# Patient Record
Sex: FEMALE | Race: WHITE
[De-identification: names, ages, dates, MRNs, and addresses within clinical notes are randomized per-mention and may not be internally consistent; named-entity substitution may affect disease eponyms.]

---

## 2019-01-02 ENCOUNTER — HOSPITAL ENCOUNTER (INPATIENT)
Dept: HOSPITAL 25 - ED | Age: 79
LOS: 13 days | Discharge: NURSING, REHAB OR LONG-TERM CARE | DRG: 481 | End: 2019-01-15
Attending: INTERNAL MEDICINE | Admitting: HOSPITALIST
Payer: MEDICARE

## 2019-01-02 DIAGNOSIS — E78.5: ICD-10-CM

## 2019-01-02 DIAGNOSIS — Z83.3: ICD-10-CM

## 2019-01-02 DIAGNOSIS — Z95.5: ICD-10-CM

## 2019-01-02 DIAGNOSIS — M85.861: ICD-10-CM

## 2019-01-02 DIAGNOSIS — I48.91: ICD-10-CM

## 2019-01-02 DIAGNOSIS — B96.20: ICD-10-CM

## 2019-01-02 DIAGNOSIS — Z82.49: ICD-10-CM

## 2019-01-02 DIAGNOSIS — D62: ICD-10-CM

## 2019-01-02 DIAGNOSIS — N39.0: ICD-10-CM

## 2019-01-02 DIAGNOSIS — I25.10: ICD-10-CM

## 2019-01-02 DIAGNOSIS — Z79.02: ICD-10-CM

## 2019-01-02 DIAGNOSIS — Z79.899: ICD-10-CM

## 2019-01-02 DIAGNOSIS — I11.0: ICD-10-CM

## 2019-01-02 DIAGNOSIS — E03.9: ICD-10-CM

## 2019-01-02 DIAGNOSIS — S72.141A: Primary | ICD-10-CM
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
## 2019-01-02 DIAGNOSIS — Z79.82: ICD-10-CM

## 2019-01-02 DIAGNOSIS — Z79.01: ICD-10-CM

## 2019-01-02 DIAGNOSIS — Z80.1: ICD-10-CM

## 2019-01-02 DIAGNOSIS — Z99.81: ICD-10-CM

## 2019-01-02 DIAGNOSIS — Z79.52: ICD-10-CM

## 2019-01-02 DIAGNOSIS — Z87.891: ICD-10-CM

## 2019-01-02 DIAGNOSIS — K21.9: ICD-10-CM

## 2019-01-02 DIAGNOSIS — J44.9: ICD-10-CM

## 2019-01-02 DIAGNOSIS — R91.1: ICD-10-CM
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
## 2019-01-02 DIAGNOSIS — R09.02: ICD-10-CM

## 2019-01-02 DIAGNOSIS — I50.22: ICD-10-CM

## 2019-01-02 DIAGNOSIS — N63.11: ICD-10-CM

## 2019-01-02 LAB
ALBUMIN SERPL BCG-MCNC: 3.2 G/DL (ref 3.2–5.2)
ALBUMIN/GLOB SERPL: 1.3 {RATIO} (ref 1–3)
ALP SERPL-CCNC: 70 U/L (ref 34–104)
ALT SERPL W P-5'-P-CCNC: 16 U/L (ref 7–52)
ANION GAP SERPL CALC-SCNC: 4 MMOL/L (ref 2–11)
APTT PPP: 26 SECONDS (ref 26–36.3)
AST SERPL-CCNC: 22 U/L (ref 13–39)
BASOPHILS # BLD AUTO: 0.1 10^3/UL (ref 0–0.2)
BUN SERPL-MCNC: 14 MG/DL (ref 6–24)
BUN/CREAT SERPL: 18.4 (ref 8–20)
CALCIUM SERPL-MCNC: 9.1 MG/DL (ref 8.6–10.3)
CHLORIDE SERPL-SCNC: 101 MMOL/L (ref 101–111)
DIGOXIN SERPL-MCNC: 0.8 NG/ML (ref 0.8–2)
EOSINOPHIL # BLD AUTO: 0.4 10^3/UL (ref 0–0.6)
GLOBULIN SER CALC-MCNC: 2.5 G/DL (ref 2–4)
GLUCOSE SERPL-MCNC: 101 MG/DL (ref 70–100)
HCO3 SERPL-SCNC: 33 MMOL/L (ref 22–32)
HCT VFR BLD AUTO: 38 % (ref 35–47)
HGB BLD-MCNC: 12.5 G/DL (ref 12–16)
INR PPP/BLD: 0.96 (ref 0.77–1.02)
LYMPHOCYTES # BLD AUTO: 0.9 10^3/UL (ref 1–4.8)
MCH RBC QN AUTO: 31 PG (ref 27–31)
MCHC RBC AUTO-ENTMCNC: 33 G/DL (ref 31–36)
MCV RBC AUTO: 95 FL (ref 80–97)
MONOCYTES # BLD AUTO: 1 10^3/UL (ref 0–0.8)
NEUTROPHILS # BLD AUTO: 9.7 10^3/UL (ref 1.5–7.7)
NRBC # BLD AUTO: 0 10^3/UL
NRBC BLD QL AUTO: 0.1
PLATELET # BLD AUTO: 163 10^3/UL (ref 150–450)
POTASSIUM SERPL-SCNC: 4.4 MMOL/L (ref 3.5–5)
PROT SERPL-MCNC: 5.7 G/DL (ref 6.4–8.9)
RBC # BLD AUTO: 4 10^6/UL (ref 4–5.4)
RBC UR QL AUTO: (no result)
SODIUM SERPL-SCNC: 138 MMOL/L (ref 135–145)
WBC # BLD AUTO: 12 10^3/UL (ref 3.5–10.8)
WBC UR QL AUTO: (no result)

## 2019-01-02 PROCEDURE — 94640 AIRWAY INHALATION TREATMENT: CPT

## 2019-01-02 PROCEDURE — 87186 SC STD MICRODIL/AGAR DIL: CPT

## 2019-01-02 PROCEDURE — 72192 CT PELVIS W/O DYE: CPT

## 2019-01-02 PROCEDURE — 71045 X-RAY EXAM CHEST 1 VIEW: CPT

## 2019-01-02 PROCEDURE — 84484 ASSAY OF TROPONIN QUANT: CPT

## 2019-01-02 PROCEDURE — 36415 COLL VENOUS BLD VENIPUNCTURE: CPT

## 2019-01-02 PROCEDURE — 99284 EMERGENCY DEPT VISIT MOD MDM: CPT

## 2019-01-02 PROCEDURE — 80162 ASSAY OF DIGOXIN TOTAL: CPT

## 2019-01-02 PROCEDURE — 80053 COMPREHEN METABOLIC PANEL: CPT

## 2019-01-02 PROCEDURE — G0279 TOMOSYNTHESIS, MAMMO: HCPCS

## 2019-01-02 PROCEDURE — 93306 TTE W/DOPPLER COMPLETE: CPT

## 2019-01-02 PROCEDURE — 81015 MICROSCOPIC EXAM OF URINE: CPT

## 2019-01-02 PROCEDURE — 86850 RBC ANTIBODY SCREEN: CPT

## 2019-01-02 PROCEDURE — 93970 EXTREMITY STUDY: CPT

## 2019-01-02 PROCEDURE — 77066 DX MAMMO INCL CAD BI: CPT

## 2019-01-02 PROCEDURE — 85730 THROMBOPLASTIN TIME PARTIAL: CPT

## 2019-01-02 PROCEDURE — 83605 ASSAY OF LACTIC ACID: CPT

## 2019-01-02 PROCEDURE — 81003 URINALYSIS AUTO W/O SCOPE: CPT

## 2019-01-02 PROCEDURE — 85610 PROTHROMBIN TIME: CPT

## 2019-01-02 PROCEDURE — 70450 CT HEAD/BRAIN W/O DYE: CPT

## 2019-01-02 PROCEDURE — 77062 BREAST TOMOSYNTHESIS BI: CPT

## 2019-01-02 PROCEDURE — 85025 COMPLETE CBC W/AUTO DIFF WBC: CPT

## 2019-01-02 PROCEDURE — 87077 CULTURE AEROBIC IDENTIFY: CPT

## 2019-01-02 PROCEDURE — 86900 BLOOD TYPING SEROLOGIC ABO: CPT

## 2019-01-02 PROCEDURE — 86901 BLOOD TYPING SEROLOGIC RH(D): CPT

## 2019-01-02 PROCEDURE — 71275 CT ANGIOGRAPHY CHEST: CPT

## 2019-01-02 PROCEDURE — 93005 ELECTROCARDIOGRAM TRACING: CPT

## 2019-01-02 PROCEDURE — 80048 BASIC METABOLIC PNL TOTAL CA: CPT

## 2019-01-02 PROCEDURE — 87086 URINE CULTURE/COLONY COUNT: CPT

## 2019-01-02 RX ADMIN — HEPARIN SODIUM SCH UNITS: 5000 INJECTION INTRAVENOUS; SUBCUTANEOUS at 22:04

## 2019-01-02 RX ADMIN — DONEPEZIL HYDROCHLORIDE SCH MG: 5 TABLET, FILM COATED ORAL at 21:57

## 2019-01-02 RX ADMIN — BUDESONIDE SCH MG: 0.5 SUSPENSION RESPIRATORY (INHALATION) at 22:52

## 2019-01-02 RX ADMIN — PRIMIDONE SCH MG: 50 TABLET ORAL at 21:58

## 2019-01-02 RX ADMIN — FORMOTEROL FUMARATE DIHYDRATE SCH MCG: 20 SOLUTION RESPIRATORY (INHALATION) at 22:52

## 2019-01-02 RX ADMIN — LEVETIRACETAM SCH MG: 500 TABLET, FILM COATED ORAL at 21:58

## 2019-01-02 RX ADMIN — POTASSIUM CHLORIDE SCH MEQ: 750 TABLET, FILM COATED, EXTENDED RELEASE ORAL at 21:58

## 2019-01-02 NOTE — ED
Lower Extremity





- HPI Summary


HPI Summary: 


78 year old female presents with htn, chf, copd, and asthma is on home oxygen 

presents with right hip pain.  she fell on a porch and landed on her right hip. 

fall was 2 days ago.  She states she was able to place weight on the leg 

yesterday but today was unable to do so due to pain.  She denies any numbness 

or tingling.  She denies any previous fracture the area.  She states the pain 

is mostly in her groin.  No back pain.  No urinary symptoms.  Fall was a 

mechanical fall.  No chest pain or shortness breath.  She is unsure if she had 

hit her head.  No loss consciousness.  She denies any chest pain or shortness 

breath.  No other injury.








- History of Current Complaint


Chief Complaint: EDExtremityLower


Stated Complaint: FELL ON RIGHT HIP


Time Seen by Provider: 01/02/19 14:36


Pain Intensity: 3





- Allergies/Home Medications


Home Medications: 


 Home Medications





Aspirin 81 mg CHEW TAB* [Aspirin Low Dose TAB*] 81 mg PO DAILY 01/02/19 [

History Confirmed 01/02/19]


Budesonide NEB* [Pulmicort NEB*] 0.5 mg INH BID 01/02/19 [History Confirmed 01/ 02/19]


Cholecalciferol (Vitamin D3) [Vitamin D3] 1,000 unit PO DAILY 01/02/19 [History 

Confirmed 01/02/19]


Clopidogrel TAB* [Plavix TAB*] 75 mg PO DAILY 01/02/19 [History Confirmed 01/02/ 19]


Digoxin [Digitek] 125 mcg PO DAILY 01/02/19 [History Confirmed 01/02/19]


Docusate Sodium [Stool Softener] 200 mg PO DAILY 01/02/19 [History Confirmed 01/ 02/19]


Donepezil TAB* [Aricept 5 MG TAB*] 10 mg PO BEDTIME 01/02/19 [History Confirmed 

01/02/19]


Ferrous Sulfate TAB* 325 mg PO DAILY 01/02/19 [History Confirmed 01/02/19]


Fluconazole 100 MG TAB* [Diflucan 100 MG TAB*] 100 mg PO WE 01/02/19 [History 

Confirmed 01/02/19]


Formoterol 20 MCG/2ML NEB (NF) [Perforomist NEB.SOLN*(NF)] 20 mcg INH BID 01/02/ 19 [History Confirmed 01/02/19]


Levalbuterol 0.63MG/3ML NEB* [Xopenex 0.63MG/3ML NEB*] 0.63 mg INH 1700 01/02/ 19 [History Confirmed 01/02/19]


Levalbuterol HFA INHALER* [Xopenex Hfa Inhaler*] 2 puff INH Q6HR PRN 01/02/19 [

History Confirmed 01/02/19]


Levothyroxine TAB* [Synthroid TAB*] 50 mcg PO DAILY 01/02/19 [History Confirmed 

01/02/19]


Ondansetron TAB* [Zofran 4 MG Tab*] 4 mg PO Q6H PRN 01/02/19 [History Confirmed 

01/02/19]


Pantoprazole TAB (NF) [Protonix TAB (NF)] 40 mg PO DAILY 01/02/19 [History 

Confirmed 01/02/19]


Potassium Chlor TAB* [Klor Con ER TAB*] 10 meq PO BID 01/02/19 [History 

Confirmed 01/02/19]


Primidone TAB(*) [Mysoline TAB(*)] 150 mg PO BEDTIME 01/02/19 [History 

Confirmed 01/02/19]


Rosuvastatin (NF) [Crestor (NF)] 10 mg PO DAILY 01/02/19 [History Confirmed 01/ 02/19]


Torsemide TAB* [Demadex*] 10 mg PO DAILY 01/02/19 [History Confirmed 01/02/19]


Venlafaxine EXT RELEASE CAP* [Effexor Xr CAP*] 225 mg PO DAILY 01/02/19 [

History Confirmed 01/02/19]


Vitamin B Complex CAP* [B Complex CAP*] 1 cap PO DAILY 01/02/19 [History 

Confirmed 01/02/19]


levETIRAcetam [Keppra] 250 mg PO TID 01/02/19 [History Confirmed 01/02/19]


predniSONE TAB* [Deltasone 10 MG TAB*] 10 mg PO DAILY 01/02/19 [History 

Confirmed 01/02/19]











PMH/Surg Hx/FS Hx/Imm Hx


Endocrine/Hematology History: Reports: Hx Anticoagulant Therapy


Cardiovascular History: Reports: Hx Hypertension


Respiratory History: Reports: Hx Asthma, Hx Chronic Obstructive Pulmonary 

Disease (COPD)





- Surgical History


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  

cholecystectomy.  appendectomy


Infectious Disease History: No


Infectious Disease History: 


   Denies: Traveled Outside the [place] in Last 30 Days





- Family History


Known Family History: Positive: Hypertension





- Social History


Alcohol Use: None


Substance Use Type: Reports: None


Smoking Status (MU): Unknown if Ever Smoked





Review of Systems


Negative: Fever


Negative: Chest Pain


Negative: Shortness Of Breath


Positive: Myalgia - right hip pain


All Other Systems Reviewed And Are Negative: Yes





Physical Exam


Triage Information Reviewed: Yes


Vital Signs On Initial Exam: 


 Initial Vitals











Temp Pulse Resp BP Pulse Ox


 


 97.8 F   85   16   115/58   99 


 


 01/02/19 14:24  01/02/19 14:24  01/02/19 14:24  01/02/19 14:24  01/02/19 14:24











Vital Signs Reviewed: Yes


Appearance: Positive: Well-Appearing


Skin: Positive: Warm, Dry


Head/Face: Positive: Normal Head/Face Inspection


Eyes: Positive: Normal, Conjunctiva Clear


ENT: Positive: Pharynx normal


Respiratory/Lung Sounds: Positive: Clear to Auscultation, Breath Sounds Present


Cardiovascular: Positive: Normal, RRR


Musculoskeletal: Positive: Other - right leg slightly shortened, good pulses, 

able to wiggle toes, tenderness right hip


Neurological: Positive: Normal


Psychiatric: Positive: Normal





Diagnostics





- Vital Signs


 Vital Signs











  Temp Pulse Resp BP Pulse Ox


 


 01/02/19 14:24  97.8 F  85  16  115/58  99














- Laboratory


Lab Results: 


 Lab Results











  01/02/19 01/02/19 01/02/19 Range/Units





  14:59 14:59 14:59 


 


WBC  12.0 H    (3.5-10.8)  10^3/ul


 


RBC  4.00    (4.00-5.40)  10^6/ul


 


Hgb  12.5    (12.0-16.0)  g/dl


 


Hct  38    (35-47)  %


 


MCV  95    (80-97)  fL


 


MCH  31    (27-31)  pg


 


MCHC  33    (31-36)  g/dl


 


RDW  14    (10.5-15)  %


 


Plt Count  163    (150-450)  10^3/ul


 


MPV  7.8    (7.4-10.4)  fL


 


Neut % (Auto)  80.9    %


 


Lymph % (Auto)  7.3    %


 


Mono % (Auto)  8.1    %


 


Eos % (Auto)  3.1    %


 


Baso % (Auto)  0.6    %


 


Absolute Neuts (auto)  9.7 H    (1.5-7.7)  10^3/ul


 


Absolute Lymphs (auto)  0.9 L    (1.0-4.8)  10^3/ul


 


Absolute Monos (auto)  1.0 H    (0-0.8)  10^3/ul


 


Absolute Eos (auto)  0.4    (0-0.6)  10^3/ul


 


Absolute Basos (auto)  0.1    (0-0.2)  10^3/ul


 


Absolute Nucleated RBC  0    10^3/ul


 


Nucleated RBC %  0.1    


 


INR (Anticoag Therapy)   0.96   (0.77-1.02)  


 


APTT   26.0   (26.0-36.3)  seconds


 


Sodium    138  (135-145)  mmol/L


 


Potassium    4.4  (3.5-5.0)  mmol/L


 


Chloride    101  (101-111)  mmol/L


 


Carbon Dioxide    33 H  (22-32)  mmol/L


 


Anion Gap    4  (2-11)  mmol/L


 


BUN    14  (6-24)  mg/dL


 


Creatinine    0.76  (0.51-0.95)  mg/dL


 


Est GFR ( Amer)    88.8  (>60)  


 


Est GFR (Non-Af Amer)    73.4  (>60)  


 


BUN/Creatinine Ratio    18.4  (8-20)  


 


Glucose    101 H  ()  mg/dL


 


Lactic Acid     (0.5-2.0)  mmol/L


 


Calcium    9.1  (8.6-10.3)  mg/dL


 


Total Bilirubin    0.70  (0.2-1.0)  mg/dL


 


AST    22  (13-39)  U/L


 


ALT    16  (7-52)  U/L


 


Alkaline Phosphatase    70  ()  U/L


 


Troponin I    0.02  (<0.04)  ng/mL


 


Total Protein    5.7 L  (6.4-8.9)  g/dL


 


Albumin    3.2  (3.2-5.2)  g/dL


 


Globulin    2.5  (2-4)  g/dL


 


Albumin/Globulin Ratio    1.3  (1-3)  


 


Blood Type     


 


Antibody Screen     














  01/02/19 01/02/19 Range/Units





  14:59 14:59 


 


WBC    (3.5-10.8)  10^3/ul


 


RBC    (4.00-5.40)  10^6/ul


 


Hgb    (12.0-16.0)  g/dl


 


Hct    (35-47)  %


 


MCV    (80-97)  fL


 


MCH    (27-31)  pg


 


MCHC    (31-36)  g/dl


 


RDW    (10.5-15)  %


 


Plt Count    (150-450)  10^3/ul


 


MPV    (7.4-10.4)  fL


 


Neut % (Auto)    %


 


Lymph % (Auto)    %


 


Mono % (Auto)    %


 


Eos % (Auto)    %


 


Baso % (Auto)    %


 


Absolute Neuts (auto)    (1.5-7.7)  10^3/ul


 


Absolute Lymphs (auto)    (1.0-4.8)  10^3/ul


 


Absolute Monos (auto)    (0-0.8)  10^3/ul


 


Absolute Eos (auto)    (0-0.6)  10^3/ul


 


Absolute Basos (auto)    (0-0.2)  10^3/ul


 


Absolute Nucleated RBC    10^3/ul


 


Nucleated RBC %    


 


INR (Anticoag Therapy)    (0.77-1.02)  


 


APTT    (26.0-36.3)  seconds


 


Sodium    (135-145)  mmol/L


 


Potassium    (3.5-5.0)  mmol/L


 


Chloride    (101-111)  mmol/L


 


Carbon Dioxide    (22-32)  mmol/L


 


Anion Gap    (2-11)  mmol/L


 


BUN    (6-24)  mg/dL


 


Creatinine    (0.51-0.95)  mg/dL


 


Est GFR ( Amer)    (>60)  


 


Est GFR (Non-Af Amer)    (>60)  


 


BUN/Creatinine Ratio    (8-20)  


 


Glucose    ()  mg/dL


 


Lactic Acid  1.6   (0.5-2.0)  mmol/L


 


Calcium    (8.6-10.3)  mg/dL


 


Total Bilirubin    (0.2-1.0)  mg/dL


 


AST    (13-39)  U/L


 


ALT    (7-52)  U/L


 


Alkaline Phosphatase    ()  U/L


 


Troponin I    (<0.04)  ng/mL


 


Total Protein    (6.4-8.9)  g/dL


 


Albumin    (3.2-5.2)  g/dL


 


Globulin    (2-4)  g/dL


 


Albumin/Globulin Ratio    (1-3)  


 


Blood Type   A Positive  


 


Antibody Screen   Negative  











Result Diagrams: 


 01/02/19 14:59





 01/02/19 14:59


Lab Statement: Any lab studies that have been ordered have been reviewed, and 

results considered in the medical decision making process.





- CT


  ** brain


CT Interpretation Completed By: Radiologist


Summary of CT Findings: IMPRESSION: Left frontal temporal craniotomy. Atrophy 

with no evidence of intracranial.  mass or hemorrhage. Encephalomalacia in the 

left frontal temporal lobe is noted.





  ** pelvis


CT Interpretation Completed By: Radiologist


Summary of CT Findings: IMPRESSION: Fracture of the right femur at the neck 

with extension into the right greater.  trochanter. The fracture may be 

slightly impacted.





- EKG


  ** No standard instances


Cardiac Rate: NL


EKG Rhythm: Sinus Rhythm


Summary of EKG Findings: sinus rhythm, bundle branch block





Lower Extremity Course/Dx





- Course


Course Of Treatment: 78 year old female presents with htn, chf, copd, and 

asthma is on home oxygen presents with right hip pain.  she fell on a porch and 

landed on her right hip. fall was 2 days ago.  She states she was able to place 

weight on the leg yesterday but today was unable to do so due to pain.  She 

denies any numbness or tingling.  She denies any previous fracture the area.  

She states the pain is mostly in her groin.  No back pain.  No urinary 

symptoms.  Fall was a mechanical fall.  No chest pain or shortness breath.  She 

is unsure if she had hit her head.  No loss consciousness.  She denies any 

chest pain or shortness breath.  No other injury.  on exam has slight 

shortening of right leg. neurovascular intact. CT brain no new finding. CT 

pelvis showed fracture of right femur at the neck with extrension into right 

greater trochanter.  spoke with dr saldaña who wants xrays of the hip. spoke 

with dr santos who agrees to admit.





- Diagnoses


Differential Diagnosis/HQI/PQRI: Positive: Fracture (Closed), Sprain, Strain


Provider Diagnoses: 


 Fracture of greater trochanter of right femur








Discharge





- Sign-Out/Discharge


Documenting (check all that apply): Patient Departure





- Discharge Plan


Condition: Stable


Disposition: ADMITTED TO [facility]


Referrals: 


No Primary Care Phys,NOPCP [Primary Care Provider] - 





- Billing Disposition and Condition


Condition: STABLE


Disposition: Admitted to [facility]

## 2019-01-02 NOTE — CONS
CONSULTATION REPORT:

 

DATE OF CONSULT:  01/02/19

 

ATTENDING PHYSICIAN:  Magalys Orlando MD

 

CHIEF COMPLAINT:  Right hip pain.

 

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 
ambulator who fell [place] when she slipped on wet wood and landed on 
her right side.  She had immediate pain, but was able to weight bear for about 
2 days until this morning.  She was having difficulty getting up.  She was able 
to sit for prolonged periods of time, walk around her daughter's house and go 
to the bathroom, and then all of a sudden she could not weight bear comfortably 
today.  She has a complicated medical history, is on home oxygen; has asthma, 
COPD, CHF.  She is currently on Plavix and took her last dose yesterday.  She 
denies any significant fevers or chills.  She does have a history 3 weeks ago 
presenting to an ER with a possible stroke which the patient states was ruled 
not a stroke.  No numbness or tingling.  She does have an abrasion to the right 
anterior aspect of the shin, but that was unrelated.  She bumped her leg on 
something.

 

PAST MEDICAL HISTORY:  Significant for:

 

1.  Hypertension.

2.  CHF.

3.  COPD.

4.  Asthma, on home O2.

5.  History of benign brain tumor.

6.  She has heart disease, status post catheterization.

 

PAST SURGICAL HISTORY:  Tonsillectomy, appendectomy, cholecystectomy, surgery 
on the left side of her skull for a brain tumor that was benign and underwent 
craniotomy and then subsequently had a revision craniotomy for loose hardware 
but this was a few years ago.  She has an implantable device and has had 
cardiac stents as well.

 

MEDICATIONS:  At home include:

 

1.  Aspirin.

2.  Budesonide.

3.  Cholecalciferol.

4.  Plavix.

5.  Digoxin.

6.  Docusate.

7.  Donepezil.

8.  Ferrous sulfate.

9.  Fluconazole.

10.  Formoterol.

11.  Levalbuterol.

12.  Levothyroxine.

13.  Ondansetron.

14.  Pantoprazole.

15.  Potassium chloride.

16.  Primidone.

17.  Rosuvastatin.

18.  Torsemide.

19.  Venlafaxine.

20.  Vitamin B.

21.  Keppra.

22.  Prednisone.

 

FAMILY HISTORY:  Significant for high blood pressure.

 

SOCIAL HISTORY:  She lives with her daughter.  She uses no assistive devices.  
She is right-hand dominant.  She quit smoking many years ago.  She denies 
alcohol and illicit drugs.  She lives in a [facility].

 

REVIEW OF SYSTEMS:  A 14-point review of systems reviewed with the patient, 
significant only for the right hip pain, abrasion to the right leg that was 
from previously 3 weeks ago, history of possible stroke which was ruled not a 
stroke, no chest pain.  She has shortness of breath at baseline and is on 
oxygen at baseline. Otherwise, remainder of the system is negative.

 

PHYSICAL EXAM:  She is in no acute distress.  She is well developed, well 
nourished.  She is alert and  oriented x3.  She has pleasant mood and normal 
affect.  She has dentures.  Temperature is 97.8, pulse is 74, O2 is 99%, blood 
pressure is 117/78.  She is resting comfortably in her bed.  She has pleasant 
mood and normal affect.  EOMI. She respires easily with no labored breathing. 
Abdomen is soft and nontender. Heart is regular rate and rhythm. Examination of 
the right hip demonstrates skin is intact.  She is tender about the lateral 
aspect of the hip.  She has mild discomfort with internal rotation.  Her calf 
is soft and nontender.  She has abrasion to the anterior tibia and she is 
sensate to light touch about the first dorsal space, medial, lateral, dorsal 
and plantar foot.  She has a brisk cap refill.

 

DIAGNOSTIC STUDIES/LAB DATA:  X-rays:  Views of the right hip and femur were 
reviewed that demonstrates a greater trochanteric fracture.  CT scan that was 
obtained prior to the x-rays demonstrated a greater trochanteric fracture that 
extends into the femoral neck posteriorly.

 

Labs:  White blood cell count of 12, hematocrit of 38, platelet of 163.  INR of 
0.96.  Sodium of 138, potassium 4.4, chloride 101, carbon dioxide 33, BUN is 14
, creatinine 0.76, glucose 101, lactic acid 1.6, and troponin 0.02.

 

ASSESSMENT AND PLAN:  This is an unusual fracture.  She [place] herself [place] approximately 2 days ago and was initially able to weight bear, but 
now it is progressing not being able to weight bear.  It looks like the 
fracture is connecting to the femoral neck and is extending along the 
intertrochanteric line. At this point, I am unable to get an MRI to see if 
there is any femoral neck edema, but I assume based on the patient's exam and 
the worsening symptoms that she has. I will treat this with an intramedullary 
nail.  I did discuss with one of my colleagues about management of this.  The 
patient's family does know Dr. Moreau and has asked me to reach out to her for a 
possible intervention.  The patient is unable to have surgery until she is 
appropriately medically optimized, part of which is including 72 hours from her 
last Plavix dose.  She is currently more than a day and will not be able to be 
a candidate for surgery until Friday afternoon.  I will touch base with my 
colleague, Dr. Moreau about this as well.  Risks and benefits of surgery were 
discussed at length, including but not limited to bleeding; infection; damage 
to nerves, vessels, surrounding structures; wound nonhealing; persistent pain; 
need for surgery; scaring stiffness; incomplete release of symptoms; risks of 
anesthesia.  We talked about loss of bone level and functionality mobility.  We 
talked about the risk of DVT and mortality.  I discussed the plan with the 
hospital service.  We will continue monitoring her until she is able to proceed 
with surgery.

 

 [id number]/CPS #: [id number]

ELIZA

## 2019-01-03 LAB
ANION GAP SERPL CALC-SCNC: 4 MMOL/L (ref 2–11)
BASOPHILS # BLD AUTO: 0 10^3/UL (ref 0–0.2)
BUN SERPL-MCNC: 18 MG/DL (ref 6–24)
BUN/CREAT SERPL: 20 (ref 8–20)
CALCIUM SERPL-MCNC: 8.7 MG/DL (ref 8.6–10.3)
CHLORIDE SERPL-SCNC: 101 MMOL/L (ref 101–111)
EOSINOPHIL # BLD AUTO: 0.4 10^3/UL (ref 0–0.6)
GLUCOSE SERPL-MCNC: 104 MG/DL (ref 70–100)
HCO3 SERPL-SCNC: 32 MMOL/L (ref 22–32)
HCT VFR BLD AUTO: 37 % (ref 35–47)
HGB BLD-MCNC: 12.2 G/DL (ref 12–16)
INR PPP/BLD: 0.98 (ref 0.77–1.02)
LYMPHOCYTES # BLD AUTO: 0.8 10^3/UL (ref 1–4.8)
MCH RBC QN AUTO: 31 PG (ref 27–31)
MCHC RBC AUTO-ENTMCNC: 33 G/DL (ref 31–36)
MCV RBC AUTO: 95 FL (ref 80–97)
MONOCYTES # BLD AUTO: 1.1 10^3/UL (ref 0–0.8)
NEUTROPHILS # BLD AUTO: 8.1 10^3/UL (ref 1.5–7.7)
NRBC # BLD AUTO: 0 10^3/UL
NRBC BLD QL AUTO: 0
PLATELET # BLD AUTO: 157 10^3/UL (ref 150–450)
POTASSIUM SERPL-SCNC: 3.9 MMOL/L (ref 3.5–5)
RBC # BLD AUTO: 3.91 10^6/UL (ref 4–5.4)
SODIUM SERPL-SCNC: 137 MMOL/L (ref 135–145)
WBC # BLD AUTO: 10.5 10^3/UL (ref 3.5–10.8)

## 2019-01-03 RX ADMIN — OMEPRAZOLE SCH MG: 20 CAPSULE, DELAYED RELEASE ORAL at 09:27

## 2019-01-03 RX ADMIN — LEVOTHYROXINE SODIUM SCH MCG: 50 TABLET ORAL at 05:57

## 2019-01-03 RX ADMIN — HEPARIN SODIUM SCH UNITS: 5000 INJECTION INTRAVENOUS; SUBCUTANEOUS at 05:59

## 2019-01-03 RX ADMIN — FORMOTEROL FUMARATE DIHYDRATE SCH MCG: 20 SOLUTION RESPIRATORY (INHALATION) at 19:40

## 2019-01-03 RX ADMIN — DIGOXIN SCH MG: 125 TABLET ORAL at 09:26

## 2019-01-03 RX ADMIN — DONEPEZIL HYDROCHLORIDE SCH MG: 5 TABLET, FILM COATED ORAL at 21:06

## 2019-01-03 RX ADMIN — BUDESONIDE SCH MG: 0.5 SUSPENSION RESPIRATORY (INHALATION) at 07:31

## 2019-01-03 RX ADMIN — GUAIFENESIN SCH MG: 600 TABLET, EXTENDED RELEASE ORAL at 21:06

## 2019-01-03 RX ADMIN — PREDNISONE SCH MG: 10 TABLET ORAL at 09:27

## 2019-01-03 RX ADMIN — LEVETIRACETAM SCH MG: 500 TABLET, FILM COATED ORAL at 14:36

## 2019-01-03 RX ADMIN — DOCUSATE SODIUM SCH MG: 100 CAPSULE, LIQUID FILLED ORAL at 09:23

## 2019-01-03 RX ADMIN — FORMOTEROL FUMARATE DIHYDRATE SCH MCG: 20 SOLUTION RESPIRATORY (INHALATION) at 07:31

## 2019-01-03 RX ADMIN — ATORVASTATIN CALCIUM SCH MG: 20 TABLET, FILM COATED ORAL at 09:27

## 2019-01-03 RX ADMIN — OXYCODONE HYDROCHLORIDE AND ACETAMINOPHEN PRN TAB: 5; 325 TABLET ORAL at 05:58

## 2019-01-03 RX ADMIN — HEPARIN SODIUM SCH UNITS: 5000 INJECTION INTRAVENOUS; SUBCUTANEOUS at 14:37

## 2019-01-03 RX ADMIN — HEPARIN SODIUM SCH UNITS: 5000 INJECTION INTRAVENOUS; SUBCUTANEOUS at 21:11

## 2019-01-03 RX ADMIN — FERROUS SULFATE TAB 325 MG (65 MG ELEMENTAL FE) SCH MG: 325 (65 FE) TAB at 09:26

## 2019-01-03 RX ADMIN — TORSEMIDE SCH MG: 20 TABLET ORAL at 09:24

## 2019-01-03 RX ADMIN — POTASSIUM CHLORIDE SCH MEQ: 750 TABLET, FILM COATED, EXTENDED RELEASE ORAL at 21:05

## 2019-01-03 RX ADMIN — ACETAMINOPHEN PRN MG: 325 TABLET ORAL at 21:06

## 2019-01-03 RX ADMIN — LEVETIRACETAM SCH MG: 500 TABLET, FILM COATED ORAL at 21:05

## 2019-01-03 RX ADMIN — PRIMIDONE SCH MG: 50 TABLET ORAL at 21:06

## 2019-01-03 RX ADMIN — VENLAFAXINE HYDROCHLORIDE SCH MG: 75 CAPSULE, EXTENDED RELEASE ORAL at 09:23

## 2019-01-03 RX ADMIN — POTASSIUM CHLORIDE SCH MEQ: 750 TABLET, FILM COATED, EXTENDED RELEASE ORAL at 09:26

## 2019-01-03 RX ADMIN — BUDESONIDE SCH MG: 0.5 SUSPENSION RESPIRATORY (INHALATION) at 19:40

## 2019-01-03 RX ADMIN — LEVALBUTEROL HYDROCHLORIDE SCH MG: 0.63 SOLUTION RESPIRATORY (INHALATION) at 16:56

## 2019-01-03 RX ADMIN — LEVETIRACETAM SCH MG: 500 TABLET, FILM COATED ORAL at 09:26

## 2019-01-03 RX ADMIN — CEFTRIAXONE SODIUM SCH MLS/HR: 1 INJECTION, POWDER, FOR SOLUTION INTRAVENOUS at 15:01

## 2019-01-03 NOTE — PN
Subjective


Date of Service: 01/03/19


Interval History: 





Pt states that she is doing well, and states that her right hip feels okay "as 

long as I don't move it."  She has a cough, which she states is usual for her, 

and there has been no increase or decrease in the cough or sputum production.





Objective


Active Medications: 








Acetaminophen (Tylenol Tab*)  650 mg PO Q4H PRN


Atorvastatin Calcium (Lipitor*)  20 mg PO DAILY [facility]; Protocol


Budesonide (Pulmicort Neb*)  0.5 mg INH BID ZONIA


Digoxin (Lanoxin Tab*)  0.125 mg PO DAILY ZONIA


Docusate Sodium (Colace Cap*)  200 mg PO DAILY ZONIA


Donepezil HCl (Aricept Tab*)  10 mg PO BEDTIME ZONIA


Ferrous Sulfate (Ferrous Sulfate Tab*)  325 mg PO DAILY ZONIA


Formoterol Fumarate (Perforomist Neb.Soln*(Nf))  20 mcg INH BID ZONIA


Heparin Sodium (Porcine) (Heparin Vial(*))  5,000 units SUBCUT Q8HR ZONIA


Levalbuterol HCl (Xopenex 0.63mg/3ml Neb*)  0.63 mg INH 1700 ZONIA


Levalbuterol HCl (Xopenex Hfa Inhaler*)  2 puff INH Q6HR PRN


Levetiracetam (Keppra Tab*)  250 mg PO TID ZONIA


Levothyroxine Sodium (Synthroid Tab*)  50 mcg PO DAILY@0600 ZONIA


Omeprazole (Prilosec Cap*)  20 mg PO DAILY@0730 ZONIA


Oxycodone/Acetaminophen (Percocet 5/325 Tab*)  1 tab PO Q4H PRN


Potassium Chloride (Klor Con Er Tab*)  10 meq PO BID ZONIA


Prednisone (Deltasone Tab*)  10 mg PO DAILY ZONIA


Primidone (Mysoline Tab(*))  150 mg PO BEDTIME ZONIA


Torsemide (Demadex*)  10 mg PO DAILY ZONIA


Venlafaxine HCl (Effexor Xr Cap*)  225 mg PO DAILY ZONIA








Vital Signs:











Temp Pulse Resp BP Pulse Ox


 


 100.0 F   83   16   127/62   98 


 


 01/03/19 07:28  01/03/19 09:26  01/03/19 09:28  01/03/19 07:28  01/03/19 07:34











Oxygen Devices in Use Now: Nasal Cannula


Appearance: Pt is resting comfortably in bed and has just finished her 

breakfast.  She appears to be in no acute distress.


Eyes: No Scleral Icterus, PERRLA


Ears/Nose/Mouth/Throat: NL Teeth, Lips, Gums


Neck: NL Appearance and Movements; NL JVP, Trachea Midline


Respiratory: Symmetrical Chest Expansion and Respiratory Effort, Clear to 

Auscultation - Decreased breath sounds b/l; withouth wheeze, rales, rhonchi.


Cardiovascular: NL Sounds; No Murmurs; No JVD, RRR, No Edema


Abdominal: NL Sounds; No Tenderness; No Distention


Extremities: No Edema, No Clubbing, Cyanosis, - - B/l pedal pulses and gross 

sensation intact b/l in LE; capillary refill <2sec


Neurological: Alert and Oriented x 3, NL Sensation


Result Diagrams: 


 01/03/19 04:37





 01/03/19 04:37


Additional Lab and Data: 


 Lab Results











  01/02/19 01/02/19 01/02/19 Range/Units





  14:59 14:59 14:59 


 


WBC  12.0 H    (3.5-10.8)  10^3/ul


 


RBC  4.00    (4.00-5.40)  10^6/ul


 


Hgb  12.5    (12.0-16.0)  g/dl


 


Hct  38    (35-47)  %


 


MCV  95    (80-97)  fL


 


MCH  31    (27-31)  pg


 


MCHC  33    (31-36)  g/dl


 


RDW  14    (10.5-15)  %


 


Plt Count  163    (150-450)  10^3/ul


 


MPV  7.8    (7.4-10.4)  fL


 


Neut % (Auto)  80.9    %


 


Lymph % (Auto)  7.3    %


 


Mono % (Auto)  8.1    %


 


Eos % (Auto)  3.1    %


 


Baso % (Auto)  0.6    %


 


Absolute Neuts (auto)  9.7 H    (1.5-7.7)  10^3/ul


 


Absolute Lymphs (auto)  0.9 L    (1.0-4.8)  10^3/ul


 


Absolute Monos (auto)  1.0 H    (0-0.8)  10^3/ul


 


Absolute Eos (auto)  0.4    (0-0.6)  10^3/ul


 


Absolute Basos (auto)  0.1    (0-0.2)  10^3/ul


 


Absolute Nucleated RBC  0    10^3/ul


 


Nucleated RBC %  0.1    


 


INR (Anticoag Therapy)   0.96   (0.77-1.02)  


 


APTT   26.0   (26.0-36.3)  seconds


 


Sodium    138  (135-145)  mmol/L


 


Potassium    4.4  (3.5-5.0)  mmol/L


 


Chloride    101  (101-111)  mmol/L


 


Carbon Dioxide    33 H  (22-32)  mmol/L


 


Anion Gap    4  (2-11)  mmol/L


 


BUN    14  (6-24)  mg/dL


 


Creatinine    0.76  (0.51-0.95)  mg/dL


 


Est GFR ( Amer)    88.8  (>60)  


 


Est GFR (Non-Af Amer)    73.4  (>60)  


 


BUN/Creatinine Ratio    18.4  (8-20)  


 


Glucose    101 H  ()  mg/dL


 


Lactic Acid     (0.5-2.0)  mmol/L


 


Calcium    9.1  (8.6-10.3)  mg/dL


 


Total Bilirubin    0.70  (0.2-1.0)  mg/dL


 


AST    22  (13-39)  U/L


 


ALT    16  (7-52)  U/L


 


Alkaline Phosphatase    70  ()  U/L


 


Troponin I    0.02  (<0.04)  ng/mL


 


Total Protein    5.7 L  (6.4-8.9)  g/dL


 


Albumin    3.2  (3.2-5.2)  g/dL


 


Globulin    2.5  (2-4)  g/dL


 


Albumin/Globulin Ratio    1.3  (1-3)  


 


Blood Type     


 


Antibody Screen     














  01/02/19 01/02/19 Range/Units





  14:59 14:59 


 


WBC    (3.5-10.8)  10^3/ul


 


RBC    (4.00-5.40)  10^6/ul


 


Hgb    (12.0-16.0)  g/dl


 


Hct    (35-47)  %


 


MCV    (80-97)  fL


 


MCH    (27-31)  pg


 


MCHC    (31-36)  g/dl


 


RDW    (10.5-15)  %


 


Plt Count    (150-450)  10^3/ul


 


MPV    (7.4-10.4)  fL


 


Neut % (Auto)    %


 


Lymph % (Auto)    %


 


Mono % (Auto)    %


 


Eos % (Auto)    %


 


Baso % (Auto)    %


 


Absolute Neuts (auto)    (1.5-7.7)  10^3/ul


 


Absolute Lymphs (auto)    (1.0-4.8)  10^3/ul


 


Absolute Monos (auto)    (0-0.8)  10^3/ul


 


Absolute Eos (auto)    (0-0.6)  10^3/ul


 


Absolute Basos (auto)    (0-0.2)  10^3/ul


 


Absolute Nucleated RBC    10^3/ul


 


Nucleated RBC %    


 


INR (Anticoag Therapy)    (0.77-1.02)  


 


APTT    (26.0-36.3)  seconds


 


Sodium    (135-145)  mmol/L


 


Potassium    (3.5-5.0)  mmol/L


 


Chloride    (101-111)  mmol/L


 


Carbon Dioxide    (22-32)  mmol/L


 


Anion Gap    (2-11)  mmol/L


 


BUN    (6-24)  mg/dL


 


Creatinine    (0.51-0.95)  mg/dL


 


Est GFR ( Amer)    (>60)  


 


Est GFR (Non-Af Amer)    (>60)  


 


BUN/Creatinine Ratio    (8-20)  


 


Glucose    ()  mg/dL


 


Lactic Acid  1.6   (0.5-2.0)  mmol/L


 


Calcium    (8.6-10.3)  mg/dL


 


Total Bilirubin    (0.2-1.0)  mg/dL


 


AST    (13-39)  U/L


 


ALT    (7-52)  U/L


 


Alkaline Phosphatase    ()  U/L


 


Troponin I    (<0.04)  ng/mL


 


Total Protein    (6.4-8.9)  g/dL


 


Albumin    (3.2-5.2)  g/dL


 


Globulin    (2-4)  g/dL


 


Albumin/Globulin Ratio    (1-3)  


 


Blood Type   A Positive  


 


Antibody Screen   Negative  














Assess/Plan/Problems-Billing


Assessment: 





Pt is a 78 yof with a history of HTN, CHF, and COPD, who presented to the ER s/

p fall.  Pt was found to have a R femur fracture.  Pt is awaiting surgery on 

the fracture.  Pt discontinued Plavix 01/02/2019.





- Patient Problems


(1) Femur fracture, right


Comment: 


-Optimal waiting time is 5d after plavix is discontinued.  Despite the fact 

that the patient is in need of surgical repair of the femur, there is concern 

for prolonged bleeding or hemorrhage if surgery is not postponed for 5d from 

last dose of Plavix.  Case was discussed with Dr. Bernal, and she is in 

agreement.


-Management of fracture per ortho   





(2) Congestive heart failure


Comment: 


-Echo ordered


-Continue Demadex   





(3) Urine leukocytes


Comment: 


-Gram negative bacilli in urine; will await sensitivity


-Ceftriaxone ordered   





(4) Chronic obstructive pulmonary disease


Comment: 


-Pt has no change in her baseline cough


-Flu, CXR negative


-Mucinex ordered


-Incentive spirometry


-Continue pulmicort, perforomist, Xopenex, and prednisone, as ordered at home   





(5) Coronary artery disease


Comment: 


-Continue crestor


-Hold aspirin and plavix   





(6) Hypothyroid


Comment: 


-Continue levothyroxine   





(7) GERD (gastroesophageal reflux disease)


Comment: 


-Continue Omeprazole    





(8) DVT prophylaxis


Comment: 


-Continue Heparin   





(9) Full code status





Status and Disposition: 





Inpatient.  Discharge home after surgery when stable.

## 2019-01-03 NOTE — HP
CC:  Dr. Romo, [place], [place]; Dr. Orlando *

 

HISTORY AND PHYSICAL:

 

DATE OF ADMISSION:  01/02/19

 

PROVIDER:  Angle Cerda NP.

 

PRIMARY CARE PROVIDER:  Dr. Romo in [place], [place].

 

ATTENDING PHYSICIAN WHILE IN THE HOSPITAL:  Dr. Sumi Wilkes * (dictated by 
Angle Cerda NP).

 

CHIEF COMPLAINT:

1.  Fall.

2.  Right hip fracture.

 

HISTORY OF PRESENT ILLNESS:  Ms. Cutler is a 78-year-old female with past 
medical history significant for severe COPD requiring 2 to 3 L of oxygen around 
the clock, history of CHF several years ago, history of atrial fibrillation, 
history of esophageal stricture, history of UTIs, hypothyroidism, 
hyperlipidemia who presented to the emergency room after a slip/trip and fall 
on 12/31/18 at approximately 8 p.m.  The patient states that she was going to 
her daughter's house and slipped on the porch, falling to her knees.  Her 
granddaughter and daughter were able to stand her up.  She did complain of some 
soreness in the right hip upon standing as well as the right knee.  Per the 
daughters and the patient, she was able to walk and sit in a chair yesterday 
without any issues, but was using a walker due to increased pain.  This morning 
when the patient awoke, the patient was unable to walk, so they called the 
ambulance to bring her to the hospital for further evaluation.  The patient 
only reports pain when moving or rolling to the right side.

 

The patient does have a history of hospitalization in [place] approximately 2 
to 3 weeks ago for left-sided weakness, where she was worked up for a TIA and 
it was undetermined whether she had a TIA or not versus seizures.  At this time
, I am obtaining both medical records.  The patient denies any recent fevers, 
unintended weight loss, chest pain, or edema.  Denies any cough, hemoptysis.  
She does report chronic shortness of breath that is currently at her baseline.  
Denies any nausea, vomiting, diarrhea, abdominal pain, gross hematuria or 
dysuria, urinary frequency, urgency, or pain with urination.  Family reports 
that the patient does lean to the left side.  Denies any visual complaint, 
dysphasia, arthralgia, or myalgia.  She does complain of mild right hip pain.  
She does have a scabbed area noted to her right lower extremity.  No psychosis 
or anxiety.  Given the finding of her right hip fracture, we were asked to see 
and evaluate her for admission.

 

PAST MEDICAL HISTORY:

1.  COPD requiring 2 to 3 L of oxygen.

2.  History of CHF.

3.  History of atrial fibrillation.

4.  History of esophageal stricture.

5.  History of UTIs.

6.  Hypothyroidism.

7.  Hyperlipidemia.

8.  Coronary artery disease.

 

PAST SURGICAL HISTORY:

1.  Appendectomy.

2.  Benign abdominal tumor removed in 1975.

3.  Cardiac stents.

4.  Pacemaker defibrillator placement.

 

HOME MEDICATIONS:

1.  Perforomist 20 mcg/2 mL solution 2 times daily.

2.  Budesonide 0.5 mg/2 mL 2 times daily.

3.  Xopenex 0.63 mg 1 time daily at 5 p.m.

4.  Pantoprazole 40 mg once daily.

5.  Fexofenadine HCL  one tab p.o. daily.

6.  Synthroid 50 mcg 1 tab p.o. daily.

7.  Prednisone 10 mg p.o. daily.

8.  Zofran 4 mg every 6 hours as needed for nausea.

9.  Furosemide 10 mg daily.

10.  Vitamin D3.

11.  Vitamin B complex.

12.  Digitek 125 mcg once daily.

13.  Plavix 75 once daily.

14.  Crestor 10 mg p.o. daily.

15.  Stool softener 100 mg 2 times daily.

16.  Iron 325 daily.

17.  Potassium chloride 10 mEq 2 times daily.

18.  Fluconazole 100 mg on Wednesdays only.

19.  Primidone 50 mg 3 at bedtime.

20.  Baby aspirin 1 tablet daily.

21.  Aricept 10 mg at bedtime.

22.  Keppra 250 mg 3 times a day.

23.  Xopenex inhaler as needed for shortness of breath.

 

ALLERGIES:  No known drug allergies.

 

FAMILY HISTORY:  Unknown history of CAD.  Grandmother with a history of 
diabetes. Father with lung cancer.

 

SOCIAL HISTORY:  The patient was a former smoker.  She quit smoking 
approximately 30 years ago.  Prior to that, she smoked a half pack to three 
quarters of a pack a day for 25+ years.  She denies any alcohol or illicit drug 
use.  She lives with her daughter.  Surrogate decision maker in the event she 
is unable to make her own decisions is her daughter, Gisell.  She is a full code.

 

REVIEW OF SYSTEMS:  She denies any fever or unintended weight loss, chest pain, 
or edema.  Denies any cough or hemoptysis.  She does report chronic shortness 
of breath requiring 2 to 3 L of oxygen.  She reports that she is at her 
baseline. Denies any nausea, vomiting, diarrhea, abdominal pain, hematuria, 
dysuria, urinary frequency or urgency, or pain with urination.  She denies any 
focal weakness or sensory loss.  Daughter does report that she leans to her 
left side when walking. She denies any dysphagia.  No visual complaints.  
Denies any arthralgias or myalgias.  Does complain of right hip pain when 
rolling on to the right side.  She does have an open scabbed area noted to the 
right lower leg.  She denies any psychosis or anxiety.

 

                               PHYSICAL EXAMINATION

 

GENERAL:  At this time, Ms. Cutler is a 78-year-old female, sitting on the 
stretcher in the emergency room.  She is alert and oriented x3.  She does not 
appear to be in acute distress.

 

HEENT:  Head is atraumatic and normocephalic.  Eyes:  EOMs are intact.  Sclerae 
are anicteric and not pale.  Oral mucosa appears to be moist.

 

NECK:  Supple.

 

LUNGS:  Clear to auscultation bilaterally.  No wheezes, rales, or rhonchi.

 

CARDIAC:  S1, S2.  Regular rate and rhythm.  No murmurs, rubs, or gallops.

 

ABDOMEN:  Soft and nontender.  Bowel sounds are present x4.

 

EXTREMITIES:  Pedal pules are +2 bilaterally.  There is mild swelling noted to 
bilateral lower extremities.

 

NEUROLOGIC:  She is awake, alert, oriented x3.  Speech is clear.  Tongue is 
midline.  Smiles equal.  Handgrips are equal.  Shoulder shrug is intact.  Finger
-to-nose is intact.  Push-pull is intact to lower extremities.  There are no 
gross focal deficits seen.

 

SKIN:  Intact.  She does have a small bruise noted to her left knee as well as 
a scabbed area noted to her right lower leg with mild redness around the wound.

 

 DIAGNOSTIC STUDIES AND LABORATORY DATA:  WBCs are 12.0, RBCs 4.0, hemoglobin 
is 12.5, hematocrit is 38, platelet count is 163.  INR was 0.96.  Sodium 138, 
potassium 4.4, chloride 101, carbon dioxide was 33, anion gap was 4, BUN was 14
, creatinine 0.76, glucose was 101, lactic acid 1.6.  ASTs were 22, ALTs were 
16. Troponin 0.02.  Digoxin level was pending.  UA, C and S is pending.

 

She had a CT of the brain, radiologist's impression:  Left frontal and temporal 
craniotomy, atrophy with no evidence of intracranial mass or hemorrhage. 
Encephalomalacia in the left frontal and temporal lobes.

 

She had a CT of the pelvis:  Fracture of the right femur at the neck with 
extension into the right greater trochanter.  Fracture may be slightly impacted.

 

She had a chest x-ray:  No active cardiopulmonary disease.

 

She had a femur x-ray:  There appears to be asymmetric discontinuity along the 
superior cortex of the greater trochanter with questionable lucent lines 
extending inferiorly into the medial cavity, though the lesser trochanter 
appears to be intact.  If there is a strong clinical suggestion for an occult 
fracture of the right hip, CT imaging is advised.

 

ASSESSMENT AND PLAN:  Ms. Cutler is a 78-year-old female with a past medical 
history significant for chronic obstructive pulmonary disease; requiring 
chronic oxygen at 2 to 3 L, history of congestive heart failure, history of 
atrial fibrillation, history of esophageal strictures, history of urinary tract 
infections, hypothyroidism, hyperlipidemia, and history of craniotomy who 
presented to the emergency room with right hip pain after a fall on 12/31/18.  
The patient was found to have a right hip fracture. she will be admitted 
inpatient for:



1. Right HIP fracture.  We did consult orthopedics.  Dr. Orlando saw the patient 
in the emergency room.  At this time, we are going to hold her Plavix and 
aspirin as this should be held for 5 days prior to surgery optimally.  I will 
place her on heparin subcu 5000 units q.8 hours.  The patient prior to her fall 
was able to walk to 30 to 50 feet without any issue.  She would take a bus 
daily to [facility] and return home and walk back into the house without 
any issue.  The patient was recently seen at [facility] in [place] and 
worked up for TIA/stroke/seizure symptoms.  I am obtaining those records now, 
in which she had a recent echocardiogram.  Pending those records, further 
recommendations prior to surgery will be made.  The patient will be placed on 
bedrest.

 2.  Chronic obstructive pulmonary disease.  The patient should continue on her 
Xopenex, prednisone 10 mg, Xopenex nebulizers, Perforomist, Pulmicort nebs as 
previously prescribed at home.

3.  Atrial fibrillation.  The patient is currently in sinus rhythm.  We are 
holding her Plavix and aspirin at this time due to pending surgery.  We will 
continue digoxin 125 mcg p.o. daily.

4.  Coronary artery disease.  The patient will continue on Crestor.  I will 
hold her aspirin and Plavix at this time due to pending surgery.

5.  History of congestive heart failure.  We will continue her furosemide as 
previously prescribed.

6.  Hypothyroid.  We will continue on levothyroxine as previously prescribed.

7.  Gastroesophageal reflux disease.  She will continue on Protonix as 
previously prescribed.

8.  FEN.  She can have a heart-healthy, decaf-okay diet.

9.  Code status:  She is a full code.

10.  DVT prophylaxis:  I will place her on heparin subcu q.8 hours.  This will 
need to be held 8 hours prior to surgery.

 

TIME SPENT:  Time spent on this admission was 60 minutes, greater than half 
that time was spent at the bedside reviewing events leading up to her 
hospitalization, obtaining my history and physical, the other half the time was 
spent going over my plan of care and implementing my plan of care.

 

I have discussed with my attending, Dr. Sumi Wilkes; she is in agreement with 
my plan.

 

 ____________________________________ ANGLE CERDA, ZORAIDA

 

[id number]/[facility] #: [id number]

ELIZA

## 2019-01-03 NOTE — ECHO
Patient:      MARRY PIEDRA   

Med Rec#:     [id number]            :          1940          

Date:         2019            Age:          78y                 

Account#:     [id number]          Height:       163 cm / 64.2 in

Accession#:   [id number]           Weight:       54 kg / 119.0 lbs

Sex:          F                     BSA:          1.57

Room#:        350                   

Admit Date#:  2019          

Type:         Inpatient

 

Referring:    Katia Heredia

Reading:      Qutaybeh Maghaydah, MD

Sonographer:  Beatrice Foley RD,RDMS

______________________________________________________________________

 

Transthoracic Echocardiogram

 

Indication:

CHF

BP:           117/59

HR:           91

Rhythm:       NSR

 

Findings     

History:

Severe COPD, CHF, AFIB, PCI, pacemaker, esophageal stricture, TIA,

former smoker. 

 

Technical Comments:

The study quality is fair.  

 

Left Ventricle:

The left ventricular chamber size is normal. There is no left

ventricular hypertrophy.   Left ventricular systolic function is at the

lower limits of normal.  The estimated ejection fraction is 45-50%. 

There is an E to A reversal in the mitral valve flow pattern suggestive

of diastolic dysfunction. 

 

Left Atrium:

The left atrium is not well visualized. The left atrial chamber size is

normal. 

 

Right Ventricle:

The right ventricular chamber size and systolic function are within

normal limits. The right ventricle wall thickness is mildly increased. A

pacemaker wire is visualized in the right ventricle. 

 

Right Atrium:

The right atrial cavity size is normal. A pacemaker wire is visualized

in the right atrium. 

 

Aortic Valve:

The aortic valve is trileaflet. The aortic valve leaflets are mildly

thickened. There is no evidence of aortic regurgitation. There is no

evidence of aortic stenosis. 

 

Mitral Valve:

The mitral valve leaflets are mildly thickened. There is a trace of

mitral regurgitation. There is no evidence of mitral stenosis. 

 

Tricuspid Valve:

The tricuspid valve leaflets are normal.  There is trace tricuspid

regurgitation.  No pulmonary hypertension is noted. 

 

Pulmonic Valve:

The pulmonic valve structure is not well visualized. 

 

Pericardium:

There is no significant pericardial effusion. A pericardial fat pad is

visualized. 

 

Aorta:

The ascending aorta is not well visualized. There is no dilatation of

the aortic arch. There is no dilation of the aortic root. 

 

Pulmonary Artery:

The main pulmonary artery is not well visualized. 

 

Venous:

The inferior vena cava appears normal in size. There is a greater than

50% respiratory change in the inferior vena cava dimension. 

 

Summary:

There was not any prior study for comparison. 

 

Conclusions

The left ventricular chamber size is normal.

Left ventricular systolic function is at the lower limits of normal. 

The estimated ejection fraction is 45-50%. 

A pacemaker wire is visualized in the right ventricle.

A pacemaker wire is visualized in the right atrium.

There is a trace of mitral regurgitation.

There is trace tricuspid regurgitation. 

 

Measurements     

Name                    Value         Normal Range            

RVIDd (AP) 2D           2.5 cm        (0.9 - 2.6)             

RVDdMajor (2D)          26 cm         (2.2 - 4.4)             

RAd ISD 4CH             4.4 cm        (3.4 - 4.9)             

RA (A4C)W               3.6 cm        (2.9 - 4.6)             

IVSd (2D)               1 cm          (0.6 - 1)               

LVPWd (2D)              1 cm          (0.6 - 1)               

LVIDd (2D)              4.5 cm        (3.6 - 5.4)             

LVIDs (2D)              3.5 cm        -                        

LV FS (2D)              21 %          (25 - 45)               

Aortic Annulus          2 cm          (1.4 - 2.6)             

Ao root diameter (2D)   3.4 cm        (2.1 - 3.5)             

Aortic arch             3.1 cm        (1.8 - 3.4)             

LA dimension (AP) 2D    3.2 cm        (2.3 - 3.8)             

 

Name                    Value         Normal Range            

MV E-wave Vmax          0.6 m/sec     -                        

MV deceleration time    79 msec       -                        

MV A-wave Vmax          0.8 m/sec     -                        

MV E:A ratio            0.8 ratio     -                        

LV septal e' Vmax       0.05 m/sec    -                        

LV lateral e' Vmax      0.08 m/sec    -                        

LV E:e' septal ratio    13 ratio      -                        

LV E:e' lateral ratio   8 ratio       -                        

 

Name                    Value         Normal Range            

AV Vmax                 1.1 m/sec     -                        

AV VTI                  17 cm         -                        

AV peak gradient        5 mmHg        -                        

AV mean gradient        3 mmHg        -                        

LVOT Vmax               0.8 m/sec     -                        

LVOT VTI                12 cm         -                        

LVOT peak gradient      2.6 mmHg      -                        

LVOT mean gradient      1 mmHg        -                        

ANDREW Vmax                0.5 m/sec     -                        

 

Name                    Value         Normal Range            

TR Vmax                 2.1 m/sec     -                        

TR peak gradient        17 mmHg       -                        

RAP                     3 mmHg        -                        

RVSP                    20 mmHg       -                        

IVC diameter            1.3 cm        -                        

 

Name                    Value         Normal Range            

PV Vmax                 0.7 m/sec     -                        

PV peak gradient        2 mmHg        -                        

 

Electronically signed by: Qutaybeh Maghaydah, MD on 2019 18:57:32

## 2019-01-03 NOTE — PN
Progress Note





- Progress Note


Date of Service: 01/03/19


SOAP: 


Subjective:


[]Patient was seen and examined at bedside. She is feeling well aside from pain 

of her right hip with movement. Denies CP, SOB, dizziness, nausea. Her last 

dose of plavix was Wednesday morning before lunch, unsure of exact time. 








Objective:


[]General: Well appearing, NAD 


RLE: Skin envelope intact aside from healing abrasion of anterior shin without 

surrounding erythema. Thigh is soft and mildly tender to palpation in superior 

lateral region. DF/PF intact, DP2+, sensation intact to light touch distally


Calves supple and nontender without erythema, edema or palpable cords








Assessment:


[]Right hip fx





Plan:


[]Hold aspirin and plavix. Call out to hospitalist to discuss length of hold 

recommended


Heparin SQ for DVT prophylaxis to be held 8 hours prior to surgery


NPO at midnight night before surgery


Monitor temp currently 99.5, repeat cxr ordered and ceftriaxone started


NWB RLE

















 Vital Signs











Temp  99.5 F   01/03/19 11:38


 


Pulse  86   01/03/19 11:38


 


Resp  17   01/03/19 11:38


 


BP  117/59   01/03/19 11:38


 


Pulse Ox  100   01/03/19 11:38








 Intake & Output











 01/02/19 01/03/19 01/03/19





 18:59 06:59 18:59


 


Intake Total  220 210


 


Output Total  550 


 


Balance  -330 210


 


Weight 119 lb 120 lb 


 


Intake:   


 


  Oral  220 210


 


Output:   


 


  [place]aza  550 








 Laboratory Last Values











WBC  10.5 10^3/ul (3.5-10.8)   01/03/19  04:37    


 


RBC  3.91 10^6/ul (4.00-5.40)  L  01/03/19  04:37    


 


Hgb  12.2 g/dl (12.0-16.0)   01/03/19  04:37    


 


Hct  37 % (35-47)   01/03/19  04:37    


 


MCV  95 fL (80-97)   01/03/19  04:37    


 


MCH  31 pg (27-31)   01/03/19  04:37    


 


MCHC  33 g/dl (31-36)   01/03/19  04:37    


 


RDW  14 % (10.5-15)   01/03/19  04:37    


 


Plt Count  157 10^3/ul (150-450)   01/03/19  04:37    


 


MPV  8.0 fL (7.4-10.4)   01/03/19  04:37    


 


Neut % (Auto)  77.6 %  01/03/19  04:37    


 


Lymph % (Auto)  7.8 %  01/03/19  04:37    


 


Mono % (Auto)  10.3 %  01/03/19  04:37    


 


Eos % (Auto)  4.0 %  01/03/19  04:37    


 


Baso % (Auto)  0.3 %  01/03/19  04:37    


 


Absolute Neuts (auto)  8.1 10^3/ul (1.5-7.7)  H  01/03/19  04:37    


 


Absolute Lymphs (auto)  0.8 10^3/ul (1.0-4.8)  L  01/03/19  04:37    


 


Absolute Monos (auto)  1.1 10^3/ul (0-0.8)  H  01/03/19  04:37    


 


Absolute Eos (auto)  0.4 10^3/ul (0-0.6)   01/03/19  04:37    


 


Absolute Basos (auto)  0 10^3/ul (0-0.2)   01/03/19  04:37    


 


Absolute Nucleated RBC  0 10^3/ul  01/03/19  04:37    


 


Nucleated RBC %  0   01/03/19  04:37    


 


INR (Anticoag Therapy)  0.98  (0.77-1.02)   01/03/19  04:37    


 


APTT  26.0 seconds (26.0-36.3)   01/02/19  14:59    


 


Sodium  137 mmol/L (135-145)   01/03/19  04:37    


 


Potassium  3.9 mmol/L (3.5-5.0)   01/03/19  04:37    


 


Chloride  101 mmol/L (101-111)   01/03/19  04:37    


 


Carbon Dioxide  32 mmol/L (22-32)   01/03/19  04:37    


 


Anion Gap  4 mmol/L (2-11)   01/03/19  04:37    


 


BUN  18 mg/dL (6-24)   01/03/19  04:37    


 


Creatinine  0.90 mg/dL (0.51-0.95)   01/03/19  04:37    


 


Est GFR ( Amer)  73.1  (>60)   01/03/19  04:37    


 


Est GFR (Non-Af Amer)  60.4  (>60)   01/03/19  04:37    


 


BUN/Creatinine Ratio  20.0  (8-20)   01/03/19  04:37    


 


Glucose  104 mg/dL ()  H  01/03/19  04:37    


 


Lactic Acid  1.6 mmol/L (0.5-2.0)   01/02/19  14:59    


 


Calcium  8.7 mg/dL (8.6-10.3)   01/03/19  04:37    


 


Total Bilirubin  0.70 mg/dL (0.2-1.0)   01/02/19  14:59    


 


AST  22 U/L (13-39)   01/02/19  14:59    


 


ALT  16 U/L (7-52)   01/02/19  14:59    


 


Alkaline Phosphatase  70 U/L ()   01/02/19  14:59    


 


Troponin I  0.02 ng/mL (<0.04)   01/02/19  14:59    


 


Total Protein  5.7 g/dL (6.4-8.9)  L  01/02/19  14:59    


 


Albumin  3.2 g/dL (3.2-5.2)   01/02/19  14:59    


 


Globulin  2.5 g/dL (2-4)   01/02/19  14:59    


 


Albumin/Globulin Ratio  1.3  (1-3)   01/02/19  14:59    


 


Urine Color  Rosy   01/02/19  20:10    


 


Urine Appearance  Cloudy   01/02/19  20:10    


 


Urine pH  6.0  (5-9)   01/02/19  20:10    


 


Ur Specific Gravity  1.016  (1.010-1.030)   01/02/19  20:10    


 


Urine Protein  Negative  (Negative)   01/02/19  20:10    


 


Urine Ketones  Negative  (Negative)   01/02/19  20:10    


 


Urine Blood  2+  (Negative)  A  01/02/19  20:10    


 


Urine Nitrate  Negative  (Negative)   01/02/19  20:10    


 


Urine Bilirubin  Negative  (Negative)   01/02/19  20:10    


 


Urine Urobilinogen  Negative  (Negative)   01/02/19  20:10    


 


Ur Leukocyte Esterase  3+  (Negative)  A  01/02/19  20:10    


 


Urine WBC (Auto)  3+(>20/hpf)  (Absent)  A  01/02/19  20:10    


 


Urine RBC (Auto)  2+(6-10/hpf)  (Absent)  A  01/02/19  20:10    


 


Urine Bacteria  Absent  (Absent)   01/02/19  20:10    


 


Urine Glucose  Negative  (Negative)   01/02/19  20:10    


 


Digoxin  0.8 ng/ml (0.8-2.0)   01/02/19  14:59    


 


Blood Type  A Positive   01/02/19  14:59    


 


Antibody Screen  Negative   01/02/19  14:59

## 2019-01-03 NOTE — CONS
CONSULTATION NOTE:

 

DATE OF CONSULT:  01/03/19

 

CHIEF COMPLAINT:  Right hip pain.

 

HISTORY OF PRESENT ILLNESS:  Ms. Cutler is a 78-year-old community ambulator.  
[place], but initially was able to walk with some slight 
right hip pain.  Over the last 2 days, she developed increasing right hip pain 
and all of a sudden could not bear weight.  She was brought to [facility] and found on CT of the pelvis to have a minimally displaced 
intertrochanteric right hip fracture.

 

The patient was seen by Dr. Orlando, my colleague.  The patient's daughter did 
request that I perform her surgery and Dr. Orlando graciously did ask me to see 
the patient.

 

PAST MEDICAL HISTORY:  Hypertension, CHF, COPD, asthma, on home O2, benign 
brain tumor, and heart disease.

 

PAST SURGICAL HISTORY:  Heart catheterization, tonsillectomy, appendectomy, 
cholecystectomy, craniotomy, implantable device with cardiac stents.

 

HOME MEDICATIONS:  See home medication list.

 

ALLERGIES:  No known drug allergies.

 

FAMILY HISTORY:  High blood pressure.

 

SOCIAL HISTORY:  The patient lives with her daughter, right-hand dominant.  No 
tobacco, alcohol or recreational drugs.

 

REVIEW OF SYSTEMS:  14 systems were reviewed with the patient.  Positive for 
the right hip pain, recent fall, shortness of breath at baseline, on home 
oxygen. Negative for fevers, chills, chest pain, nausea, vomiting, headache, or 
dizziness. Otherwise, the patient and her daughter report review of systems is 
negative.

 

PHYSICAL EXAM:  Vitals:  Temperature 98.8, pulse 87, blood pressure 117/59. 
General:  The patient is a thin female, on oxygen, in no apparent distress, 
sitting up in bed, eating.  Her daughters are at her bedside.  Alert and 
oriented x2, to person and place, but not to time.  Chest:  Unlabored 
breathing.  HEENT: Atraumatic and normocephalic.  Right lower extremity:  The 
patient's skin is intact.  Small amount of bruising along the lateral hip.  
Flexed to 80 degrees, with minimal pain.  Distally neurovascularly intact.  
Dorsiflexion and plantar flexion.  2+ palpable DP pulse.  Thigh is soft and 
mildly tender to palpation around the proximal hip region.

 

RADIOGRAPHS:  CT pelvis and multiple right hip films show a minimally displaced 
fracture involving the greater trochanter and intertrochanteric region, with a 
low femoral neck component.

 

ASSESSMENT AND PLAN:  Ms. Cutler is a 78-year-old female, status post fall, 
with a minimally displaced fracture of her right hip.

 

The patient, her daughters, and I discussed both nonoperative and operative 
treatment options.  I do think that the best course is operative fixation of 
this fracture to prevent any further displacement and to allow her to ambulate 
as soon as possible.  The patient and her daughters would like to proceed with 
surgery. The patient's daughter has requested that I perform the surgery and I 
agreed to this.

 

The patient is on Plavix and the hospitalist team is recommending 5 days off of 
the Plavix.  She last took the Plavix on 

01/01/19.  We will plan for 01/05/19, early a.m., open reduction and internal 
fixation of the right hip fracture with a cephalomedullary device.  The risks 
and benefits of the surgery are discussed with the patient and her daughters.  
They understand the high mortality and morbidity rate.  She will be n.p.o. 
after midnight on 01/04/19.

 

Thank you for this orthopedic consultation.

 

 [id number]/[facility] #: [id number]

ELIZA

## 2019-01-04 RX ADMIN — GUAIFENESIN SCH MG: 600 TABLET, EXTENDED RELEASE ORAL at 21:34

## 2019-01-04 RX ADMIN — LEVALBUTEROL HYDROCHLORIDE SCH MG: 0.63 SOLUTION RESPIRATORY (INHALATION) at 16:54

## 2019-01-04 RX ADMIN — FORMOTEROL FUMARATE DIHYDRATE SCH MCG: 20 SOLUTION RESPIRATORY (INHALATION) at 07:23

## 2019-01-04 RX ADMIN — POTASSIUM CHLORIDE SCH MEQ: 750 TABLET, FILM COATED, EXTENDED RELEASE ORAL at 08:49

## 2019-01-04 RX ADMIN — DIGOXIN SCH MG: 125 TABLET ORAL at 08:49

## 2019-01-04 RX ADMIN — FORMOTEROL FUMARATE DIHYDRATE SCH: 20 SOLUTION RESPIRATORY (INHALATION) at 19:55

## 2019-01-04 RX ADMIN — GUAIFENESIN SCH MG: 600 TABLET, EXTENDED RELEASE ORAL at 08:50

## 2019-01-04 RX ADMIN — BUDESONIDE SCH MG: 0.5 SUSPENSION RESPIRATORY (INHALATION) at 16:58

## 2019-01-04 RX ADMIN — PRIMIDONE SCH MG: 50 TABLET ORAL at 21:32

## 2019-01-04 RX ADMIN — VENLAFAXINE HYDROCHLORIDE SCH MG: 75 CAPSULE, EXTENDED RELEASE ORAL at 08:47

## 2019-01-04 RX ADMIN — BUDESONIDE SCH: 0.5 SUSPENSION RESPIRATORY (INHALATION) at 19:55

## 2019-01-04 RX ADMIN — FORMOTEROL FUMARATE DIHYDRATE SCH MCG: 20 SOLUTION RESPIRATORY (INHALATION) at 17:00

## 2019-01-04 RX ADMIN — MAGNESIUM HYDROXIDE SCH: 400 SUSPENSION ORAL at 12:11

## 2019-01-04 RX ADMIN — LEVETIRACETAM SCH MG: 500 TABLET, FILM COATED ORAL at 21:34

## 2019-01-04 RX ADMIN — PREDNISONE SCH MG: 10 TABLET ORAL at 08:46

## 2019-01-04 RX ADMIN — DONEPEZIL HYDROCHLORIDE SCH MG: 5 TABLET, FILM COATED ORAL at 21:33

## 2019-01-04 RX ADMIN — OMEPRAZOLE SCH MG: 20 CAPSULE, DELAYED RELEASE ORAL at 08:48

## 2019-01-04 RX ADMIN — DOCUSATE SODIUM SCH MG: 100 CAPSULE, LIQUID FILLED ORAL at 08:49

## 2019-01-04 RX ADMIN — POTASSIUM CHLORIDE SCH MEQ: 750 TABLET, FILM COATED, EXTENDED RELEASE ORAL at 21:33

## 2019-01-04 RX ADMIN — LEVOTHYROXINE SODIUM SCH MCG: 50 TABLET ORAL at 05:59

## 2019-01-04 RX ADMIN — BUDESONIDE SCH MG: 0.5 SUSPENSION RESPIRATORY (INHALATION) at 07:23

## 2019-01-04 RX ADMIN — HEPARIN SODIUM SCH UNITS: 5000 INJECTION INTRAVENOUS; SUBCUTANEOUS at 15:14

## 2019-01-04 RX ADMIN — HEPARIN SODIUM SCH UNITS: 5000 INJECTION INTRAVENOUS; SUBCUTANEOUS at 05:59

## 2019-01-04 RX ADMIN — CEFTRIAXONE SODIUM SCH MLS/HR: 1 INJECTION, POWDER, FOR SOLUTION INTRAVENOUS at 15:14

## 2019-01-04 RX ADMIN — ATORVASTATIN CALCIUM SCH MG: 20 TABLET, FILM COATED ORAL at 08:47

## 2019-01-04 RX ADMIN — HEPARIN SODIUM SCH UNITS: 5000 INJECTION INTRAVENOUS; SUBCUTANEOUS at 21:37

## 2019-01-04 RX ADMIN — ACETAMINOPHEN PRN MG: 325 TABLET ORAL at 21:32

## 2019-01-04 RX ADMIN — MAGNESIUM HYDROXIDE SCH: 400 SUSPENSION ORAL at 21:35

## 2019-01-04 RX ADMIN — LEVETIRACETAM SCH MG: 500 TABLET, FILM COATED ORAL at 15:14

## 2019-01-04 RX ADMIN — TORSEMIDE SCH MG: 20 TABLET ORAL at 08:47

## 2019-01-04 RX ADMIN — LEVETIRACETAM SCH MG: 500 TABLET, FILM COATED ORAL at 08:47

## 2019-01-04 RX ADMIN — FERROUS SULFATE TAB 325 MG (65 MG ELEMENTAL FE) SCH MG: 325 (65 FE) TAB at 08:49

## 2019-01-04 NOTE — PN
Progress Note





- Progress Note


Date of Service: 01/04/19


SOAP: 


Subjective:


[]Patient seen at bedside.  Alert, pain well managed, doing crossword puzzle.  

Denies, SOB, CP, palpitations or dizziness. Understands she is scheduled for 

hip surgery tomorrow with Dr. Moreau.








Objective:


[]


 Vital Signs











Temp  98.3 F   01/04/19 07:40


 


Pulse  85   01/04/19 08:49


 


Resp  18   01/04/19 08:00


 


BP  134/68   01/04/19 07:40


 


Pulse Ox  100   01/04/19 07:40








 Intake & Output











 01/03/19 01/04/19 01/04/19





 18:59 06:59 18:59


 


Intake Total 620 500 


 


Output Total 1050 750 


 


Balance -430 -250 


 


Intake:   


 


  Oral 620 500 


 


Output:   


 


  Plaza 1050 750 


 


Other:   


 


  # Bowel Movements  0 








 Laboratory Results - last 24 hr











  01/03/19





  15:09


 


Influenza A (Rapid)  Negative


 


Influenza B (Rapid)  Negative








right leg without significant shortening or external rotation


+DF right ankle


2+ pedal pulse


sensation intact








Assessment:


[]right hip fx- IT/ low femoral neck








Plan:


[]Bedrest


  NPO after midnight tonight- Short Gamma nail in am with Dr. Moreau 1/5/19


  On ceftriaxone for UTI


  Hold heparin after last dose tonight

## 2019-01-04 NOTE — PN
Subjective


Date of Service: 01/04/19


Interval History: 





Pt states she is doing well.  She has minimal hip discomfort, unless she moves 

her leg.  She denies CP, SOB, ha, cough, lower extremity pain, abdominal pain 

or discomfort.





Objective


Active Medications: 








Acetaminophen (Tylenol Tab*)  650 mg PO Q4H PRN


   PRN Reason: FEVER/PAIN


   Last Admin: 01/03/19 21:06 Dose:  650 mg


Atorvastatin Calcium (Lipitor*)  20 mg PO DAILY [facility]; Protocol


   Last Admin: 01/04/19 08:47 Dose:  20 mg


Budesonide (Pulmicort Neb*)  0.5 mg INH BID [facility]


   Last Admin: 01/04/19 07:23 Dose:  0.5 mg


Digoxin (Lanoxin Tab*)  0.125 mg PO DAILY [facility]


   Last Admin: 01/04/19 08:49 Dose:  0.125 mg


Docusate Sodium (Colace Cap*)  200 mg PO DAILY [facility]


   Last Admin: 01/04/19 08:49 Dose:  200 mg


Donepezil HCl (Aricept Tab*)  10 mg PO BEDTIME [facility]


   Last Admin: 01/03/19 21:06 Dose:  10 mg


Ferrous Sulfate (Ferrous Sulfate Tab*)  325 mg PO DAILY [facility]


   Last Admin: 01/04/19 08:49 Dose:  325 mg


Formoterol Fumarate (Perforomist Neb.Soln*(Nf))  20 mcg INH BID [facility]


   Last Admin: 01/04/19 07:23 Dose:  20 mcg


Guaifenesin (Mucinex*)  600 mg PO BID [facility]


   Last Admin: 01/04/19 08:50 Dose:  600 mg


Heparin Sodium (Porcine) (Heparin Vial(*))  5,000 units SUBCUT Q8HR [facility]


   Last Admin: 01/04/19 05:59 Dose:  5,000 units


Ceftriaxone Sodium 1 gm/ (Sodium Chloride)  50 mls @ 200 mls/hr IVPB Q24H [facility]


   Stop: 01/08/19 15:00


   Last Admin: 01/03/19 15:01 Dose:  200 mls/hr


Lactulose (Lactulose*)  30 ml PO DAILY PRN


   PRN Reason: CONSTIPATION


   Last Admin: 01/04/19 12:10 Dose:  30 ml


Levalbuterol HCl (Xopenex 0.63mg/3ml Neb*)  0.63 mg INH 1700 [facility]


   Last Admin: 01/03/19 16:56 Dose:  0.63 mg


Levalbuterol HCl (Xopenex Hfa Inhaler*)  2 puff INH Q6HR PRN


   PRN Reason: SHORTNESS OF BREATH


Levetiracetam (Keppra Tab*)  250 mg PO TID [facility]


   Last Admin: 01/04/19 08:47 Dose:  250 mg


Levothyroxine Sodium (Synthroid Tab*)  50 mcg PO DAILY@0600 [facility]


   Last Admin: 01/04/19 05:59 Dose:  50 mcg


Magnesium Hydroxide (Milk Of Magnesia Liq*)  30 ml PO BID [facility]


   Last Admin: 01/04/19 12:11 Dose:  Not Given


Magnesium Hydroxide (Milk Of Magnesia Liq*)  30 ml PO BID PRN


   PRN Reason: CONSTIPATION


Omeprazole (Prilosec Cap*)  20 mg PO DAILY@0730 [facility]


   Last Admin: 01/04/19 08:48 Dose:  20 mg


Oxycodone/Acetaminophen (Percocet 5/325 Tab*)  1 tab PO Q4H PRN


   PRN Reason: Pain


   Last Admin: 01/03/19 05:58 Dose:  1 tab


Potassium Chloride (Klor Con Er Tab*)  10 meq PO BID [facility]


   Last Admin: 01/04/19 08:49 Dose:  10 meq


Prednisone (Deltasone Tab*)  10 mg PO DAILY [facility]


   Last Admin: 01/04/19 08:46 Dose:  10 mg


Primidone (Mysoline Tab(*))  150 mg PO BEDTIME [facility]


   Last Admin: 01/03/19 21:06 Dose:  150 mg


Torsemide (Demadex*)  10 mg PO DAILY [facility]


   Last Admin: 01/04/19 08:47 Dose:  10 mg


Venlafaxine HCl (Effexor Xr Cap*)  225 mg PO DAILY [facility]


   Last Admin: 01/04/19 08:47 Dose:  225 mg








 Vital Signs - 8 hr











  01/04/19 01/04/19 01/04/19





  07:26 07:40 08:00


 


Temperature  98.3 F 


 


Pulse Rate 78 73 


 


Respiratory 15 16 18





Rate   


 


Blood Pressure  134/68 





(mmHg)   


 


O2 Sat by Pulse 98 100 





Oximetry   














  01/04/19





  08:49


 


Temperature 


 


Pulse Rate 85


 


Respiratory 





Rate 


 


Blood Pressure 





(mmHg) 


 


O2 Sat by Pulse 





Oximetry 











Oxygen Devices in Use Now: Nasal Cannula


Result Diagrams: 


 01/03/19 04:37





 01/03/19 04:37


Additional Lab and Data: 


 Lab Results











  01/02/19 01/02/19 01/02/19 Range/Units





  14:59 14:59 14:59 


 


WBC  12.0 H    (3.5-10.8)  10^3/ul


 


RBC  4.00    (4.00-5.40)  10^6/ul


 


Hgb  12.5    (12.0-16.0)  g/dl


 


Hct  38    (35-47)  %


 


MCV  95    (80-97)  fL


 


MCH  31    (27-31)  pg


 


MCHC  33    (31-36)  g/dl


 


RDW  14    (10.5-15)  %


 


Plt Count  163    (150-450)  10^3/ul


 


MPV  7.8    (7.4-10.4)  fL


 


Neut % (Auto)  80.9    %


 


Lymph % (Auto)  7.3    %


 


Mono % (Auto)  8.1    %


 


Eos % (Auto)  3.1    %


 


Baso % (Auto)  0.6    %


 


Absolute Neuts (auto)  9.7 H    (1.5-7.7)  10^3/ul


 


Absolute Lymphs (auto)  0.9 L    (1.0-4.8)  10^3/ul


 


Absolute Monos (auto)  1.0 H    (0-0.8)  10^3/ul


 


Absolute Eos (auto)  0.4    (0-0.6)  10^3/ul


 


Absolute Basos (auto)  0.1    (0-0.2)  10^3/ul


 


Absolute Nucleated RBC  0    10^3/ul


 


Nucleated RBC %  0.1    


 


INR (Anticoag Therapy)   0.96   (0.77-1.02)  


 


APTT   26.0   (26.0-36.3)  seconds


 


Sodium    138  (135-145)  mmol/L


 


Potassium    4.4  (3.5-5.0)  mmol/L


 


Chloride    101  (101-111)  mmol/L


 


Carbon Dioxide    33 H  (22-32)  mmol/L


 


Anion Gap    4  (2-11)  mmol/L


 


BUN    14  (6-24)  mg/dL


 


Creatinine    0.76  (0.51-0.95)  mg/dL


 


Est GFR ( Amer)    88.8  (>60)  


 


Est GFR (Non-Af Amer)    73.4  (>60)  


 


BUN/Creatinine Ratio    18.4  (8-20)  


 


Glucose    101 H  ()  mg/dL


 


Lactic Acid     (0.5-2.0)  mmol/L


 


Calcium    9.1  (8.6-10.3)  mg/dL


 


Total Bilirubin    0.70  (0.2-1.0)  mg/dL


 


AST    22  (13-39)  U/L


 


ALT    16  (7-52)  U/L


 


Alkaline Phosphatase    70  ()  U/L


 


Troponin I    0.02  (<0.04)  ng/mL


 


Total Protein    5.7 L  (6.4-8.9)  g/dL


 


Albumin    3.2  (3.2-5.2)  g/dL


 


Globulin    2.5  (2-4)  g/dL


 


Albumin/Globulin Ratio    1.3  (1-3)  


 


Blood Type     


 


Antibody Screen     














  01/02/19 01/02/19 Range/Units





  14:59 14:59 


 


WBC    (3.5-10.8)  10^3/ul


 


RBC    (4.00-5.40)  10^6/ul


 


Hgb    (12.0-16.0)  g/dl


 


Hct    (35-47)  %


 


MCV    (80-97)  fL


 


MCH    (27-31)  pg


 


MCHC    (31-36)  g/dl


 


RDW    (10.5-15)  %


 


Plt Count    (150-450)  10^3/ul


 


MPV    (7.4-10.4)  fL


 


Neut % (Auto)    %


 


Lymph % (Auto)    %


 


Mono % (Auto)    %


 


Eos % (Auto)    %


 


Baso % (Auto)    %


 


Absolute Neuts (auto)    (1.5-7.7)  10^3/ul


 


Absolute Lymphs (auto)    (1.0-4.8)  10^3/ul


 


Absolute Monos (auto)    (0-0.8)  10^3/ul


 


Absolute Eos (auto)    (0-0.6)  10^3/ul


 


Absolute Basos (auto)    (0-0.2)  10^3/ul


 


Absolute Nucleated RBC    10^3/ul


 


Nucleated RBC %    


 


INR (Anticoag Therapy)    (0.77-1.02)  


 


APTT    (26.0-36.3)  seconds


 


Sodium    (135-145)  mmol/L


 


Potassium    (3.5-5.0)  mmol/L


 


Chloride    (101-111)  mmol/L


 


Carbon Dioxide    (22-32)  mmol/L


 


Anion Gap    (2-11)  mmol/L


 


BUN    (6-24)  mg/dL


 


Creatinine    (0.51-0.95)  mg/dL


 


Est GFR ( Amer)    (>60)  


 


Est GFR (Non-Af Amer)    (>60)  


 


BUN/Creatinine Ratio    (8-20)  


 


Glucose    ()  mg/dL


 


Lactic Acid  1.6   (0.5-2.0)  mmol/L


 


Calcium    (8.6-10.3)  mg/dL


 


Total Bilirubin    (0.2-1.0)  mg/dL


 


AST    (13-39)  U/L


 


ALT    (7-52)  U/L


 


Alkaline Phosphatase    ()  U/L


 


Troponin I    (<0.04)  ng/mL


 


Total Protein    (6.4-8.9)  g/dL


 


Albumin    (3.2-5.2)  g/dL


 


Globulin    (2-4)  g/dL


 


Albumin/Globulin Ratio    (1-3)  


 


Blood Type   A Positive  


 


Antibody Screen   Negative  











Microbiology and Other Data: 


 Microbiology











 01/02/19 20:10 Urine Culture - Final





 Urine    Escherichia Coli


 


 01/03/19 14:53 Influenza Types A,B Antigen - Final





 Nasal    Specimen received for Influenza A/B Molecular testing














Assess/Plan/Problems-Billing


Assessment: 





Pt is a 78 yof with a history of HTN, CHF, and COPD, who presented to the ER s/

p fall.  Pt was found to have a R femur fracture.  Pt is awaiting surgery on 

the fracture.  Pt discontinued Plavix 01/02/2019.





- Patient Problems


(1) Femur fracture, right


Comment: 


-Plan for surgery tomorrow, per Dr. Moreau, as benefits of surgery outweight 

risk of waiting optimal time for plavix clearance.  Revised Cardiac Risk Index 

reveals Class II risk, with 0.9% risk of major cardiac event.  Pt is medically 

optimized for surgery.   





(2) Congestive heart failure


Comment: 


-Echo- LV EF is 45-50%


-Continue Demadex   





(3) Urinary tract infection


Comment: 


-Continue ceftriaxone    





(4) Chronic obstructive pulmonary disease


Comment: 


-Pt has no change in her baseline cough


-Continue mucinex, incentive spirometer


-Continue pulmicort, perforomist, Xopenex, and prednisone, as ordered at home   





(5) Coronary artery disease


Comment: 


-Continue crestor


-Hold aspirin and plavix   





(6) Hypothyroid


Comment: 


-Continue levothyroxine   





(7) GERD (gastroesophageal reflux disease)


Comment: 


-Continue Omeprazole    





(8) DVT prophylaxis


Comment: 


-Continue Heparin   





(9) Full code status





Status and Disposition: 





Inpatient.  Discharge home after surgery when stable.

## 2019-01-05 PROCEDURE — 0QS606Z REPOSITION RIGHT UPPER FEMUR WITH INTRAMEDULLARY INTERNAL FIXATION DEVICE, OPEN APPROACH: ICD-10-PCS | Performed by: ORTHOPAEDIC SURGERY

## 2019-01-05 RX ADMIN — LEVETIRACETAM SCH MG: 500 TABLET, FILM COATED ORAL at 07:18

## 2019-01-05 RX ADMIN — SODIUM CHLORIDE, SODIUM LACTATE, POTASSIUM CHLORIDE, AND CALCIUM CHLORIDE SCH MLS/HR: 600; 310; 30; 20 INJECTION, SOLUTION INTRAVENOUS at 12:00

## 2019-01-05 RX ADMIN — CEFTRIAXONE SODIUM SCH MLS/HR: 1 INJECTION, POWDER, FOR SOLUTION INTRAVENOUS at 15:33

## 2019-01-05 RX ADMIN — PREDNISONE SCH MG: 10 TABLET ORAL at 12:08

## 2019-01-05 RX ADMIN — LEVALBUTEROL HYDROCHLORIDE SCH MG: 0.63 SOLUTION RESPIRATORY (INHALATION) at 16:32

## 2019-01-05 RX ADMIN — CEFAZOLIN SCH MLS/HR: 1 INJECTION, POWDER, FOR SOLUTION INTRAVENOUS at 23:57

## 2019-01-05 RX ADMIN — FERROUS SULFATE TAB 325 MG (65 MG ELEMENTAL FE) SCH MG: 325 (65 FE) TAB at 12:08

## 2019-01-05 RX ADMIN — POTASSIUM CHLORIDE SCH MEQ: 750 TABLET, FILM COATED, EXTENDED RELEASE ORAL at 12:09

## 2019-01-05 RX ADMIN — DONEPEZIL HYDROCHLORIDE SCH MG: 5 TABLET, FILM COATED ORAL at 21:24

## 2019-01-05 RX ADMIN — DIGOXIN SCH MG: 125 TABLET ORAL at 07:18

## 2019-01-05 RX ADMIN — PRIMIDONE SCH MG: 50 TABLET ORAL at 21:25

## 2019-01-05 RX ADMIN — BUDESONIDE SCH MG: 0.5 SUSPENSION RESPIRATORY (INHALATION) at 06:25

## 2019-01-05 RX ADMIN — LEVETIRACETAM SCH MG: 500 TABLET, FILM COATED ORAL at 21:24

## 2019-01-05 RX ADMIN — LEVETIRACETAM SCH MG: 500 TABLET, FILM COATED ORAL at 14:31

## 2019-01-05 RX ADMIN — OXYCODONE HYDROCHLORIDE AND ACETAMINOPHEN PRN TAB: 5; 325 TABLET ORAL at 22:29

## 2019-01-05 RX ADMIN — FORMOTEROL FUMARATE DIHYDRATE SCH MCG: 20 SOLUTION RESPIRATORY (INHALATION) at 19:22

## 2019-01-05 RX ADMIN — GUAIFENESIN SCH MG: 600 TABLET, EXTENDED RELEASE ORAL at 21:24

## 2019-01-05 RX ADMIN — HEPARIN SODIUM SCH: 5000 INJECTION INTRAVENOUS; SUBCUTANEOUS at 04:49

## 2019-01-05 RX ADMIN — OMEPRAZOLE SCH: 20 CAPSULE, DELAYED RELEASE ORAL at 12:05

## 2019-01-05 RX ADMIN — LEVOTHYROXINE SODIUM SCH MCG: 50 TABLET ORAL at 04:47

## 2019-01-05 RX ADMIN — GUAIFENESIN SCH MG: 600 TABLET, EXTENDED RELEASE ORAL at 12:09

## 2019-01-05 RX ADMIN — POTASSIUM CHLORIDE SCH MEQ: 750 TABLET, FILM COATED, EXTENDED RELEASE ORAL at 21:23

## 2019-01-05 RX ADMIN — VENLAFAXINE HYDROCHLORIDE SCH MG: 75 CAPSULE, EXTENDED RELEASE ORAL at 12:09

## 2019-01-05 RX ADMIN — BUDESONIDE SCH: 0.5 SUSPENSION RESPIRATORY (INHALATION) at 07:03

## 2019-01-05 RX ADMIN — CEFAZOLIN SCH MLS/HR: 1 INJECTION, POWDER, FOR SOLUTION INTRAVENOUS at 16:15

## 2019-01-05 RX ADMIN — SODIUM CHLORIDE, SODIUM LACTATE, POTASSIUM CHLORIDE, AND CALCIUM CHLORIDE SCH MLS/HR: 600; 310; 30; 20 INJECTION, SOLUTION INTRAVENOUS at 21:10

## 2019-01-05 RX ADMIN — DOCUSATE SODIUM SCH MG: 100 CAPSULE, LIQUID FILLED ORAL at 12:08

## 2019-01-05 RX ADMIN — ATORVASTATIN CALCIUM SCH MG: 20 TABLET, FILM COATED ORAL at 12:07

## 2019-01-05 RX ADMIN — OXYCODONE HYDROCHLORIDE AND ACETAMINOPHEN PRN TAB: 5; 325 TABLET ORAL at 14:30

## 2019-01-05 RX ADMIN — TORSEMIDE SCH MG: 20 TABLET ORAL at 12:08

## 2019-01-05 RX ADMIN — FORMOTEROL FUMARATE DIHYDRATE SCH: 20 SOLUTION RESPIRATORY (INHALATION) at 07:03

## 2019-01-05 RX ADMIN — FORMOTEROL FUMARATE DIHYDRATE SCH MCG: 20 SOLUTION RESPIRATORY (INHALATION) at 06:28

## 2019-01-05 RX ADMIN — BUDESONIDE SCH MG: 0.5 SUSPENSION RESPIRATORY (INHALATION) at 19:22

## 2019-01-05 NOTE — OP
DATE OF OPERATION:  01/05/19 - ROOM #347

 

DATE OF BIRTH:  [birth year]

 

SURGEON:  Clemencia Moreau MD

 

ASSISTANT:  ESTER Og.  Ms. Dowd did help throughout the procedure 
with preparation of the leg, wound retraction, manipulation of the fracture 
site and wound closure.

 

ANESTHESIOLOGIST:  Dr. Quan.

 

ANESTHESIA:  General.

 

PRE-OP DIAGNOSIS:  Right closed intertrochanteric hip fracture.

 

POST-OP DIAGNOSIS:  Right closed intertrochanteric hip fracture.

 

OPERATIVE PROCEDURE:  Right intertrochanteric hip fracture open reduction and 
internal fixation with cephalomedullary device.

 

HARDWARE:  This is a short gamma nail, 11 x 180 x 125 degrees gamma nail.  Lag 
screw proximally with 10.5 x 95 mm.  Distal locking screw 5 x 35 mm.

 

COMPLICATIONS:  None.

 

ESTIMATED BLOOD LOSS:  300 cc.

 

SPECIMEN:  None.

 

BRIEF HISTORY/INDICATION:  Ms. Cutler is a 78-year-old female who had a fall 
on [place].  She was initially able to ambulate but then suddenly became 
unable to bear weight on the right lower extremity.  She was brought to [facility], found to have urinary tract infection as well as an 
intertrochanteric fracture of the right hip.  Surgery was delayed several days 
due to medical optimization.  She was medically optimized for surgery and the 
patient's family did wish to proceed with operative fixation of the fracture to 
ensure advanced therapy and mobility options.  Informed consent was obtained 
from the family.  They understood the risk of surgery included but were not 
limited to bleeding, infection, damage to nearby structures, continued pain, 
need for further surgery, intraoperative fracture, nerve palsy, hardware 
failure or loosening, periprosthetic fracture, respiratory failure, stroke, 
heart attack, blood clot, and death.  They wished to proceed.

 

INTRAOPERATIVE FINDINGS:  Intraoperatively, the patient had some comminution 
around the greater trochanter.  The intertrochanteric fracture line was 
minimally displaced.  She was noted to have osteopenia.

 

DESCRIPTION OF PROCEDURE:  Ms. Cutler was identified in the preanesthesia 
unit. Her right lower extremity was marked as the correct operative site.  
Informed consent was signed and placed in the chart.  The patient's daughter, 
her healthcare proxy did sign the consent.  The patient was taken to the 
operating room and placed under general anesthesia.  A Plaza catheter had 
already been placed.  She was placed on the fracture table.  The right lower 
extremity was placed in the fracture boot without any traction.  Left lower 
extremity was placed in the lithotomy position with adequate padding.  Right 
lower extremity was prepped and draped in the usual sterile fashion.  Preop time
-out was made to correctly identify the patient's side and site.  Appropriate 
perioperative antibiotics were given more than 1 hour of incision.

 

A 4 cm incision was made proximal to the tip of the greater trochanter. 
Electrocautery was used to dissect down through the lateral fascia layer and 
then single dissection was made to the tip of the greater trochanter.  An awl 
was used to find the appropriate starting position and the tip of the greater 
troch.  Both AP and lateral C-arm views were used to identify the proper 
starting point.  The awl was advanced through the bone.  Next, a guidewire was 
placed through the cannulated portion of the awl and advanced to cross the 
fracture site in an intramedullary position.  Multiple AP and lateral C-arm 
views confirmed that the guidewire was in an intramedullary position.

 

Next, the awl was removed.  Over the guidewire, the proximal bone was reamed 
with an opening reamer.  The 11 x 180 x 125 short gamma nail was chosen.  This 
was advanced over the guidewire without difficulty or complications.  Next, the 
lateral gamma nail arm was used to place the proximal lag screw guide.  A 10-
blade was used to make a 2 cm incision and this was carried down to the lateral 
fascia layer. Tenotomies were used to dissect down the bone.  The lag screw 
guide was advanced down to the bone and locked into position.  Using AP and 
lateral C-arm views, a central position come into the head was chosen and 
guidewire position was confirmed to be in the proper position.  Guidewire was 
measured at 90 mm; therefore, a 95 mm lag screw was chosen.  Pre-drilling was 
performed.  A 10.5 x 95 mm lag screw was placed over the guidewire proximally.  
AP and lateral C-arm views confirmed satisfactory position of the lag screw.  A 
proximal set screw was placed without any additional compression.

 

Next, attention was turned to placement of the distal locking screw.  The 
distal locking screw guide was placed and 1 cm incision was made with tenotomy 
dissection down to the bone.  The distal locking screw guide was advanced down 
to bone.  Pre- drilling was performed.  A 5 x 35 mm distal locking screw was 
placed across the nail and through the nail.  AP and lateral C-arm views 
confirmed satisfactory position of the distal locking screws through the 
implant.  Final films were obtained and saved.  The fracture reduction was 
satisfactory.  The hardware position was satisfactory. All incisions were 
copiously irrigated with sterile saline.  Fascia layers were closed using 
interrupted #1 Vicryls.  The rest of the incisions were closed in a layered 
fashion using 0 and 2-0 Vicryls.  Skin was closed using staples.  Sterile, 
Xeroform, 4x4's and paper tape were used to cover the incisions.  The patient's 
anesthesia was reversed without difficulty.  She was taken to the PACU in 
stable condition.  Intended weightbearing will be weightbearing as tolerated.  
Intended DVT prophylaxis will be Lovenox subcu for 1 month.

 

 [id number]/[facility] #: [id number]

MTDD

## 2019-01-05 NOTE — PN
Subjective


Date of Service: 01/05/19


Interval History: 








Pt reports she feels "good" post op. Reports good pain control. No nausea. 





Han numbness/tingling 





Objective


Active Medications: 








Acetaminophen (Tylenol Tab*)  650 mg PO Q4H PRN


   PRN Reason: FEVER/PAIN


   Last Admin: 01/04/19 21:32 Dose:  650 mg


Atorvastatin Calcium (Lipitor*)  20 mg PO DAILY [facility]; Protocol


   Last Admin: 01/04/19 08:47 Dose:  20 mg


Budesonide (Pulmicort Neb*)  0.5 mg INH BID [facility]


   Last Admin: 01/05/19 07:03 Dose:  Not Given


Digoxin (Lanoxin Tab*)  0.125 mg PO DAILY [facility]


   Last Admin: 01/05/19 07:18 Dose:  0.125 mg


Docusate Sodium (Colace Cap*)  200 mg PO DAILY [facility]


   Last Admin: 01/04/19 08:49 Dose:  200 mg


Donepezil HCl (Aricept Tab*)  10 mg PO BEDTIME [facility]


   Last Admin: 01/04/19 21:33 Dose:  10 mg


Fentanyl Citrate (Fentanyl*)  25 mcg IV Q5M PRN


   PRN Reason: PAIN - MODERATE


Ferrous Sulfate (Ferrous Sulfate Tab*)  325 mg PO DAILY [facility]


   Last Admin: 01/04/19 08:49 Dose:  325 mg


Formoterol Fumarate (Perforomist Neb.Soln*(Nf))  20 mcg INH BID [facility]


   Last Admin: 01/05/19 07:03 Dose:  Not Given


Guaifenesin (Mucinex*)  600 mg PO BID [facility]


   Last Admin: 01/04/19 21:34 Dose:  600 mg


Heparin Sodium (Porcine) (Heparin Vial(*))  5,000 units SUBCUT Q8HR [facility]


   Last Admin: 01/05/19 04:49 Dose:  Not Given


Ceftriaxone Sodium 1 gm/ (Sodium Chloride)  50 mls @ 200 mls/hr IVPB Q24H [facility]


   Stop: 01/08/19 15:00


   Last Admin: 01/04/19 15:14 Dose:  200 mls/hr


Lactated Ringer's (Lactated Ringers 1000 Ml Bag*)  1,000 mls @ 125 mls/hr IV 

PER RATE [facility]


Acetaminophen (Ofirmev*)  1,000 mg in 100 mls @ 400 mls/hr IVPB ONCE ONE


   Stop: 01/05/19 07:32


Lactulose (Lactulose*)  30 ml PO DAILY PRN


   PRN Reason: CONSTIPATION


   Last Admin: 01/04/19 12:10 Dose:  30 ml


Levalbuterol HCl (Xopenex 0.63mg/3ml Neb*)  0.63 mg INH 1700 [facility]


   Last Admin: 01/04/19 16:54 Dose:  0.63 mg


Levalbuterol HCl (Xopenex Hfa Inhaler*)  2 puff INH Q6HR PRN


   PRN Reason: SHORTNESS OF BREATH


Levalbuterol HCl (Xopenex 0.63mg/3ml Neb*)  0.63 mg INH ONCE PRN


   PRN Reason: SOB/WHEEZING


Levetiracetam (Keppra Tab*)  250 mg PO TID [facility]


   Last Admin: 01/05/19 07:18 Dose:  250 mg


Levothyroxine Sodium (Synthroid Tab*)  50 mcg PO DAILY@0600 [facility]


   Last Admin: 01/05/19 04:47 Dose:  50 mcg


Magnesium Hydroxide (Milk Of Magnesia Liq*)  30 ml PO BID PRN


   PRN Reason: CONSTIPATION


Naloxone HCl (Narcan*)  0.08 mg IV Q2M PRN


   PRN Reason: severe induced resp depression


Omeprazole (Prilosec Cap*)  20 mg PO DAILY@0730 [facility]


   Last Admin: 01/04/19 08:48 Dose:  20 mg


Oxycodone HCl (Roxycodone Tab*)  5 mg PO ONCE PRN


   PRN Reason: PAIN - MODERATE


Oxycodone/Acetaminophen (Percocet 5/325 Tab*)  1 tab PO Q4H PRN


   PRN Reason: Pain


   Last Admin: 01/03/19 05:58 Dose:  1 tab


Potassium Chloride (Klor Con Er Tab*)  10 meq PO BID [facility]


   Last Admin: 01/04/19 21:33 Dose:  10 meq


Prednisone (Deltasone Tab*)  10 mg PO DAILY [facility]


   Last Admin: 01/04/19 08:46 Dose:  10 mg


Primidone (Mysoline Tab(*))  150 mg PO BEDTIME [facility]


   Last Admin: 01/04/19 21:32 Dose:  150 mg


Torsemide (Demadex*)  10 mg PO DAILY [facility]


   Last Admin: 01/04/19 08:47 Dose:  10 mg


Venlafaxine HCl (Effexor Xr Cap*)  225 mg PO DAILY [facility]


   Last Admin: 01/04/19 08:47 Dose:  225 mg








 Vital Signs - 8 hr











  01/05/19 01/05/19 01/05/19





  03:52 06:30 07:18


 


Temperature 98.5 F  


 


Pulse Rate 79 79 82


 


Respiratory 16 14 





Rate   


 


Blood Pressure 117/68  





(mmHg)   


 


O2 Sat by Pulse 99 99 





Oximetry   














  01/05/19





  07:19


 


Temperature 98.0 F


 


Pulse Rate 82


 


Respiratory 16





Rate 


 


Blood Pressure 115/65





(mmHg) 


 


O2 Sat by Pulse 97





Oximetry 











Oxygen Devices in Use Now: Nasal Cannula


Appearance: elderly A+Ox3 sitting up in bed eating appears in NAD


Eyes: No Scleral Icterus, PERRLA


Ears/Nose/Mouth/Throat: NL Teeth, Lips, Gums, Mucous Membranes Moist


Neck: NL Appearance and Movements; NL JVP


Respiratory: Symmetrical Chest Expansion and Respiratory Effort, Clear to 

Auscultation


Cardiovascular: NL Sounds; No Murmurs; No JVD, RRR


Abdominal: NL Sounds; No Tenderness; No Distention


Extremities: No Clubbing, Cyanosis, -


Neurological: Alert and Oriented x 3


Lines/Tubes/Other Access: Clean, Dry and Intact Peripheral IV


Nutrition: Taking PO's


Result Diagrams: 


 01/03/19 04:37





 01/03/19 04:37


Additional Lab and Data: 


 Lab Results











  01/02/19 01/02/19 01/02/19 Range/Units





  14:59 14:59 14:59 


 


WBC  12.0 H    (3.5-10.8)  10^3/ul


 


RBC  4.00    (4.00-5.40)  10^6/ul


 


Hgb  12.5    (12.0-16.0)  g/dl


 


Hct  38    (35-47)  %


 


MCV  95    (80-97)  fL


 


MCH  31    (27-31)  pg


 


MCHC  33    (31-36)  g/dl


 


RDW  14    (10.5-15)  %


 


Plt Count  163    (150-450)  10^3/ul


 


MPV  7.8    (7.4-10.4)  fL


 


Neut % (Auto)  80.9    %


 


Lymph % (Auto)  7.3    %


 


Mono % (Auto)  8.1    %


 


Eos % (Auto)  3.1    %


 


Baso % (Auto)  0.6    %


 


Absolute Neuts (auto)  9.7 H    (1.5-7.7)  10^3/ul


 


Absolute Lymphs (auto)  0.9 L    (1.0-4.8)  10^3/ul


 


Absolute Monos (auto)  1.0 H    (0-0.8)  10^3/ul


 


Absolute Eos (auto)  0.4    (0-0.6)  10^3/ul


 


Absolute Basos (auto)  0.1    (0-0.2)  10^3/ul


 


Absolute Nucleated RBC  0    10^3/ul


 


Nucleated RBC %  0.1    


 


INR (Anticoag Therapy)   0.96   (0.77-1.02)  


 


APTT   26.0   (26.0-36.3)  seconds


 


Sodium    138  (135-145)  mmol/L


 


Potassium    4.4  (3.5-5.0)  mmol/L


 


Chloride    101  (101-111)  mmol/L


 


Carbon Dioxide    33 H  (22-32)  mmol/L


 


Anion Gap    4  (2-11)  mmol/L


 


BUN    14  (6-24)  mg/dL


 


Creatinine    0.76  (0.51-0.95)  mg/dL


 


Est GFR ( Amer)    88.8  (>60)  


 


Est GFR (Non-Af Amer)    73.4  (>60)  


 


BUN/Creatinine Ratio    18.4  (8-20)  


 


Glucose    101 H  ()  mg/dL


 


Lactic Acid     (0.5-2.0)  mmol/L


 


Calcium    9.1  (8.6-10.3)  mg/dL


 


Total Bilirubin    0.70  (0.2-1.0)  mg/dL


 


AST    22  (13-39)  U/L


 


ALT    16  (7-52)  U/L


 


Alkaline Phosphatase    70  ()  U/L


 


Troponin I    0.02  (<0.04)  ng/mL


 


Total Protein    5.7 L  (6.4-8.9)  g/dL


 


Albumin    3.2  (3.2-5.2)  g/dL


 


Globulin    2.5  (2-4)  g/dL


 


Albumin/Globulin Ratio    1.3  (1-3)  


 


Blood Type     


 


Antibody Screen     














  01/02/19 01/02/19 Range/Units





  14:59 14:59 


 


WBC    (3.5-10.8)  10^3/ul


 


RBC    (4.00-5.40)  10^6/ul


 


Hgb    (12.0-16.0)  g/dl


 


Hct    (35-47)  %


 


MCV    (80-97)  fL


 


MCH    (27-31)  pg


 


MCHC    (31-36)  g/dl


 


RDW    (10.5-15)  %


 


Plt Count    (150-450)  10^3/ul


 


MPV    (7.4-10.4)  fL


 


Neut % (Auto)    %


 


Lymph % (Auto)    %


 


Mono % (Auto)    %


 


Eos % (Auto)    %


 


Baso % (Auto)    %


 


Absolute Neuts (auto)    (1.5-7.7)  10^3/ul


 


Absolute Lymphs (auto)    (1.0-4.8)  10^3/ul


 


Absolute Monos (auto)    (0-0.8)  10^3/ul


 


Absolute Eos (auto)    (0-0.6)  10^3/ul


 


Absolute Basos (auto)    (0-0.2)  10^3/ul


 


Absolute Nucleated RBC    10^3/ul


 


Nucleated RBC %    


 


INR (Anticoag Therapy)    (0.77-1.02)  


 


APTT    (26.0-36.3)  seconds


 


Sodium    (135-145)  mmol/L


 


Potassium    (3.5-5.0)  mmol/L


 


Chloride    (101-111)  mmol/L


 


Carbon Dioxide    (22-32)  mmol/L


 


Anion Gap    (2-11)  mmol/L


 


BUN    (6-24)  mg/dL


 


Creatinine    (0.51-0.95)  mg/dL


 


Est GFR ( Amer)    (>60)  


 


Est GFR (Non-Af Amer)    (>60)  


 


BUN/Creatinine Ratio    (8-20)  


 


Glucose    ()  mg/dL


 


Lactic Acid  1.6   (0.5-2.0)  mmol/L


 


Calcium    (8.6-10.3)  mg/dL


 


Total Bilirubin    (0.2-1.0)  mg/dL


 


AST    (13-39)  U/L


 


ALT    (7-52)  U/L


 


Alkaline Phosphatase    ()  U/L


 


Troponin I    (<0.04)  ng/mL


 


Total Protein    (6.4-8.9)  g/dL


 


Albumin    (3.2-5.2)  g/dL


 


Globulin    (2-4)  g/dL


 


Albumin/Globulin Ratio    (1-3)  


 


Blood Type   A Positive  


 


Antibody Screen   Negative  











Microbiology and Other Data: 


 Microbiology











 01/02/19 20:10 Urine Culture - Final





 Urine    Escherichia Coli


 


 01/03/19 14:53 Influenza Types A,B Antigen - Final





 Nasal    Specimen received for Influenza A/B Molecular testing














Assess/Plan/Problems-Billing


Assessment: 





Pt is a 78 yof with a history of HTN, CHF, and COPD, who presented to the ER s/

p fall.  Pt was found to have a R femur fracture.





- Patient Problems


(1) Femur fracture, right


Comment: 


- Dispo per Dr. Franklin - POD #0


- Will need PT/OT


- Pain management with bowel regimen   





(2) Chronic systolic heart failure


Comment: 


-Echo- LV EF is 45-50%


-Hold Demadex in post op phase


- Daily weights   





(3) Afib


Comment: 


- s/p pacer


- continue Digoxin (Dig level wnls)   





(4) Chronic obstructive pulmonary disease


Comment: 


- s/p stent placement


-Stable


-Continue mucinex, incentive spirometer


-Continue pulmicort, perforomist, Xopenex, and prednisone, as ordered at home   





(5) Coronary artery disease


Comment: 


-Continue crestor


-Hold aspirin and plavix for surgery, restarting when cleared by Ortho Team   





(6) GERD (gastroesophageal reflux disease)


Comment: 


-Continue Omeprazole    





(7) Hypothyroid


Comment: 


-Continue levothyroxine   





(8) Urinary tract infection


Comment: 


-Continue ceftriaxone    





(9) DVT prophylaxis


Comment: 


-Continue Heparin   





(10) Full code status





Status and Disposition: 





Inpatient.  Discharge home after surgery when stable.

## 2019-01-06 LAB
ANION GAP SERPL CALC-SCNC: 2 MMOL/L (ref 2–11)
BASOPHILS # BLD AUTO: 0 10^3/UL (ref 0–0.2)
BUN SERPL-MCNC: 11 MG/DL (ref 6–24)
BUN/CREAT SERPL: 18.6 (ref 8–20)
CALCIUM SERPL-MCNC: 8.3 MG/DL (ref 8.6–10.3)
CHLORIDE SERPL-SCNC: 101 MMOL/L (ref 101–111)
EOSINOPHIL # BLD AUTO: 0.4 10^3/UL (ref 0–0.6)
GLUCOSE SERPL-MCNC: 98 MG/DL (ref 70–100)
HCO3 SERPL-SCNC: 34 MMOL/L (ref 22–32)
HCT VFR BLD AUTO: 30 % (ref 35–47)
HGB BLD-MCNC: 10.1 G/DL (ref 12–16)
LYMPHOCYTES # BLD AUTO: 1 10^3/UL (ref 1–4.8)
MCH RBC QN AUTO: 32 PG (ref 27–31)
MCHC RBC AUTO-ENTMCNC: 34 G/DL (ref 31–36)
MCV RBC AUTO: 95 FL (ref 80–97)
MONOCYTES # BLD AUTO: 1.3 10^3/UL (ref 0–0.8)
NEUTROPHILS # BLD AUTO: 7.7 10^3/UL (ref 1.5–7.7)
NRBC # BLD AUTO: 0 10^3/UL
NRBC BLD QL AUTO: 0
PLATELET # BLD AUTO: 172 10^3/UL (ref 150–450)
POTASSIUM SERPL-SCNC: 4.2 MMOL/L (ref 3.5–5)
RBC # BLD AUTO: 3.16 10^6/UL (ref 4–5.4)
SODIUM SERPL-SCNC: 137 MMOL/L (ref 135–145)
WBC # BLD AUTO: 10.4 10^3/UL (ref 3.5–10.8)

## 2019-01-06 RX ADMIN — OXYCODONE HYDROCHLORIDE AND ACETAMINOPHEN PRN TAB: 5; 325 TABLET ORAL at 05:26

## 2019-01-06 RX ADMIN — DOCUSATE SODIUM SCH MG: 100 CAPSULE, LIQUID FILLED ORAL at 09:10

## 2019-01-06 RX ADMIN — LEVETIRACETAM SCH MG: 500 TABLET, FILM COATED ORAL at 09:08

## 2019-01-06 RX ADMIN — BUDESONIDE SCH MG: 0.5 SUSPENSION RESPIRATORY (INHALATION) at 07:05

## 2019-01-06 RX ADMIN — DIGOXIN SCH MG: 125 TABLET ORAL at 09:10

## 2019-01-06 RX ADMIN — FORMOTEROL FUMARATE DIHYDRATE SCH MCG: 20 SOLUTION RESPIRATORY (INHALATION) at 16:53

## 2019-01-06 RX ADMIN — ATORVASTATIN CALCIUM SCH MG: 20 TABLET, FILM COATED ORAL at 09:08

## 2019-01-06 RX ADMIN — POTASSIUM CHLORIDE SCH MEQ: 750 TABLET, FILM COATED, EXTENDED RELEASE ORAL at 09:08

## 2019-01-06 RX ADMIN — BUDESONIDE SCH: 0.5 SUSPENSION RESPIRATORY (INHALATION) at 21:39

## 2019-01-06 RX ADMIN — LEVETIRACETAM SCH MG: 500 TABLET, FILM COATED ORAL at 21:35

## 2019-01-06 RX ADMIN — ENOXAPARIN SODIUM SCH MG: 40 INJECTION SUBCUTANEOUS at 09:14

## 2019-01-06 RX ADMIN — OXYCODONE HYDROCHLORIDE AND ACETAMINOPHEN PRN TAB: 5; 325 TABLET ORAL at 22:54

## 2019-01-06 RX ADMIN — DONEPEZIL HYDROCHLORIDE SCH MG: 5 TABLET, FILM COATED ORAL at 21:34

## 2019-01-06 RX ADMIN — LEVOTHYROXINE SODIUM SCH MCG: 50 TABLET ORAL at 05:26

## 2019-01-06 RX ADMIN — FORMOTEROL FUMARATE DIHYDRATE SCH MCG: 20 SOLUTION RESPIRATORY (INHALATION) at 07:05

## 2019-01-06 RX ADMIN — GUAIFENESIN SCH MG: 600 TABLET, EXTENDED RELEASE ORAL at 09:11

## 2019-01-06 RX ADMIN — OMEPRAZOLE SCH MG: 20 CAPSULE, DELAYED RELEASE ORAL at 08:10

## 2019-01-06 RX ADMIN — CEFTRIAXONE SODIUM SCH MLS/HR: 1 INJECTION, POWDER, FOR SOLUTION INTRAVENOUS at 15:44

## 2019-01-06 RX ADMIN — POTASSIUM CHLORIDE SCH MEQ: 750 TABLET, FILM COATED, EXTENDED RELEASE ORAL at 21:34

## 2019-01-06 RX ADMIN — PRIMIDONE SCH MG: 50 TABLET ORAL at 21:39

## 2019-01-06 RX ADMIN — PREDNISONE SCH MG: 10 TABLET ORAL at 09:10

## 2019-01-06 RX ADMIN — BUDESONIDE SCH MG: 0.5 SUSPENSION RESPIRATORY (INHALATION) at 16:52

## 2019-01-06 RX ADMIN — OXYCODONE HYDROCHLORIDE AND ACETAMINOPHEN PRN TAB: 5; 325 TABLET ORAL at 13:38

## 2019-01-06 RX ADMIN — LEVALBUTEROL HYDROCHLORIDE SCH MG: 0.63 SOLUTION RESPIRATORY (INHALATION) at 16:53

## 2019-01-06 RX ADMIN — VENLAFAXINE HYDROCHLORIDE SCH MG: 75 CAPSULE, EXTENDED RELEASE ORAL at 09:07

## 2019-01-06 RX ADMIN — MAGNESIUM HYDROXIDE PRN ML: 400 SUSPENSION ORAL at 09:11

## 2019-01-06 RX ADMIN — FERROUS SULFATE TAB 325 MG (65 MG ELEMENTAL FE) SCH MG: 325 (65 FE) TAB at 09:10

## 2019-01-06 RX ADMIN — SODIUM CHLORIDE, SODIUM LACTATE, POTASSIUM CHLORIDE, AND CALCIUM CHLORIDE SCH MLS/HR: 600; 310; 30; 20 INJECTION, SOLUTION INTRAVENOUS at 05:32

## 2019-01-06 RX ADMIN — OXYCODONE HYDROCHLORIDE AND ACETAMINOPHEN PRN TAB: 5; 325 TABLET ORAL at 09:12

## 2019-01-06 RX ADMIN — FORMOTEROL FUMARATE DIHYDRATE SCH: 20 SOLUTION RESPIRATORY (INHALATION) at 21:39

## 2019-01-06 RX ADMIN — CEFAZOLIN SCH MLS/HR: 1 INJECTION, POWDER, FOR SOLUTION INTRAVENOUS at 09:05

## 2019-01-06 RX ADMIN — OXYCODONE HYDROCHLORIDE AND ACETAMINOPHEN PRN TAB: 5; 325 TABLET ORAL at 18:05

## 2019-01-06 RX ADMIN — LEVETIRACETAM SCH MG: 500 TABLET, FILM COATED ORAL at 13:38

## 2019-01-06 RX ADMIN — GUAIFENESIN SCH MG: 600 TABLET, EXTENDED RELEASE ORAL at 21:38

## 2019-01-06 NOTE — PN
Progress Note





- Progress Note


Date of Service: 01/06/19


SOAP: 


Subjective:


resting comfortably, pain well controlled





Objective:


 Vital Signs











Temp Pulse Resp BP Pulse Ox


 


 98.9 F   77   15   120/56   99 


 


 01/06/19 03:49  01/06/19 07:07  01/06/19 07:07  01/06/19 03:49  01/06/19 07:07








 Laboratory Last Values











WBC  10.4 10^3/ul (3.5-10.8)   01/06/19  05:14    


 


RBC  3.16 10^6/ul (4.00-5.40)  L  01/06/19  05:14    


 


Hgb  10.1 g/dl (12.0-16.0)  L  01/06/19  05:14    


 


Hct  30 % (35-47)  L  01/06/19  05:14    


 


MCV  95 fL (80-97)   01/06/19  05:14    


 


MCH  32 pg (27-31)  H  01/06/19  05:14    


 


MCHC  34 g/dl (31-36)   01/06/19  05:14    


 


RDW  14 % (10.5-15)   01/06/19  05:14    


 


Plt Count  172 10^3/ul (150-450)   01/06/19  05:14    


 


MPV  8.0 fL (7.4-10.4)   01/06/19  05:14    


 


Neut % (Auto)  74.1 %  01/06/19  05:14    


 


Lymph % (Auto)  9.7 %  01/06/19  05:14    


 


Mono % (Auto)  12.5 %  01/06/19  05:14    


 


Eos % (Auto)  3.4 %  01/06/19  05:14    


 


Baso % (Auto)  0.3 %  01/06/19  05:14    


 


Absolute Neuts (auto)  7.7 10^3/ul (1.5-7.7)   01/06/19  05:14    


 


Absolute Lymphs (auto)  1.0 10^3/ul (1.0-4.8)   01/06/19  05:14    


 


Absolute Monos (auto)  1.3 10^3/ul (0-0.8)  H  01/06/19  05:14    


 


Absolute Eos (auto)  0.4 10^3/ul (0-0.6)   01/06/19  05:14    


 


Absolute Basos (auto)  0 10^3/ul (0-0.2)   01/06/19  05:14    


 


Absolute Nucleated RBC  0 10^3/ul  01/06/19  05:14    


 


Nucleated RBC %  0   01/06/19  05:14    


 


INR (Anticoag Therapy)  0.98  (0.77-1.02)   01/03/19  04:37    


 


APTT  26.0 seconds (26.0-36.3)   01/02/19  14:59    


 


Sodium  137 mmol/L (135-145)   01/06/19  05:14    


 


Potassium  4.2 mmol/L (3.5-5.0)   01/06/19  05:14    


 


Chloride  101 mmol/L (101-111)   01/06/19  05:14    


 


Carbon Dioxide  34 mmol/L (22-32)  H  01/06/19  05:14    


 


Anion Gap  2 mmol/L (2-11)   01/06/19  05:14    


 


BUN  11 mg/dL (6-24)   01/06/19  05:14    


 


Creatinine  0.59 mg/dL (0.51-0.95)   01/06/19  05:14    


 


Est GFR ( Amer)  119.0  (>60)   01/06/19  05:14    


 


Est GFR (Non-Af Amer)  98.3  (>60)   01/06/19  05:14    


 


BUN/Creatinine Ratio  18.6  (8-20)   01/06/19  05:14    


 


Glucose  98 mg/dL ()   01/06/19  05:14    


 


Lactic Acid  1.6 mmol/L (0.5-2.0)   01/02/19  14:59    


 


Calcium  8.3 mg/dL (8.6-10.3)  L  01/06/19  05:14    


 


Total Bilirubin  0.70 mg/dL (0.2-1.0)   01/02/19  14:59    


 


AST  22 U/L (13-39)   01/02/19  14:59    


 


ALT  16 U/L (7-52)   01/02/19  14:59    


 


Alkaline Phosphatase  70 U/L ()   01/02/19  14:59    


 


Troponin I  0.02 ng/mL (<0.04)   01/02/19  14:59    


 


Total Protein  5.7 g/dL (6.4-8.9)  L  01/02/19  14:59    


 


Albumin  3.2 g/dL (3.2-5.2)   01/02/19  14:59    


 


Globulin  2.5 g/dL (2-4)   01/02/19  14:59    


 


Albumin/Globulin Ratio  1.3  (1-3)   01/02/19  14:59    


 


Urine Color  Rosy   01/02/19  20:10    


 


Urine Appearance  Cloudy   01/02/19  20:10    


 


Urine pH  6.0  (5-9)   01/02/19  20:10    


 


Ur Specific Gravity  1.016  (1.010-1.030)   01/02/19  20:10    


 


Urine Protein  Negative  (Negative)   01/02/19  20:10    


 


Urine Ketones  Negative  (Negative)   01/02/19  20:10    


 


Urine Blood  2+  (Negative)  A  01/02/19  20:10    


 


Urine Nitrate  Negative  (Negative)   01/02/19  20:10    


 


Urine Bilirubin  Negative  (Negative)   01/02/19  20:10    


 


Urine Urobilinogen  Negative  (Negative)   01/02/19  20:10    


 


Ur Leukocyte Esterase  3+  (Negative)  A  01/02/19  20:10    


 


Urine WBC (Auto)  3+(>20/hpf)  (Absent)  A  01/02/19  20:10    


 


Urine RBC (Auto)  2+(6-10/hpf)  (Absent)  A  01/02/19  20:10    


 


Urine Bacteria  Absent  (Absent)   01/02/19  20:10    


 


Urine Glucose  Negative  (Negative)   01/02/19  20:10    


 


Digoxin  0.8 ng/ml (0.8-2.0)   01/02/19  14:59    


 


Influenza A (Rapid)  Negative  (Negative)   01/03/19  15:09    


 


Influenza B (Rapid)  Negative  (Negative)   01/03/19  15:09    


 


Blood Type  A Positive   01/02/19  14:59    


 


Antibody Screen  Negative   01/02/19  14:59    








incision: c/d/i


PE: NVI








Assessment:


s/p right gamma nail; POD #1








Plan:


1) PT/OT-WBAT


2) dressing change tomorrow


3) Lovenox/SCD's for DVT prophylaxis

## 2019-01-06 NOTE — PN
Subjective


Date of Service: 01/06/19


Interval History: 








Pt reports she slept well and feels good this am - she reports her pain is well 

controlled. Denies cough/SOB/CP. No fevers or chills. Good appetite.





Objective


Active Medications: 








Acetaminophen (Tylenol Tab*)  650 mg PO Q4H PRN


   PRN Reason: FEVER/PAIN


   Last Admin: 01/04/19 21:32 Dose:  650 mg


Atorvastatin Calcium (Lipitor*)  20 mg PO DAILY [facility]; Protocol


   Last Admin: 01/05/19 12:07 Dose:  20 mg


Budesonide (Pulmicort Neb*)  0.5 mg INH BID [facility]


   Last Admin: 01/06/19 07:05 Dose:  0.5 mg


Digoxin (Lanoxin Tab*)  0.125 mg PO DAILY [facility]


   Last Admin: 01/05/19 07:18 Dose:  0.125 mg


Docusate Sodium (Colace Cap*)  200 mg PO DAILY [facility]


   Last Admin: 01/05/19 12:08 Dose:  200 mg


Donepezil HCl (Aricept Tab*)  10 mg PO BEDTIME [facility]


   Last Admin: 01/05/19 21:24 Dose:  10 mg


Enoxaparin Sodium (Lovenox(*))  40 mg SUBCUT DAILY [facility]


Ferrous Sulfate (Ferrous Sulfate Tab*)  325 mg PO DAILY [facility]


   Last Admin: 01/05/19 12:08 Dose:  325 mg


Formoterol Fumarate (Perforomist Neb.Soln*(Nf))  20 mcg INH BID [facility]


   Last Admin: 01/06/19 07:05 Dose:  20 mcg


Guaifenesin (Mucinex*)  600 mg PO BID [facility]


   Last Admin: 01/05/19 21:24 Dose:  600 mg


Ceftriaxone Sodium 1 gm/ (Sodium Chloride)  50 mls @ 200 mls/hr IVPB Q24H [facility]


   Stop: 01/08/19 15:00


   Last Admin: 01/05/19 15:33 Dose:  200 mls/hr


Lactated Ringer's (Lactated Ringers 1000 Ml Bag*)  1,000 mls @ 125 mls/hr IV 

PER RATE [facility]


   Last Admin: 01/06/19 05:32 Dose:  125 mls/hr


Lactulose (Lactulose*)  30 ml PO DAILY PRN


   PRN Reason: CONSTIPATION


   Last Admin: 01/04/19 12:10 Dose:  30 ml


Levalbuterol HCl (Xopenex 0.63mg/3ml Neb*)  0.63 mg INH 1700 [facility]


   Last Admin: 01/05/19 16:32 Dose:  0.63 mg


Levalbuterol HCl (Xopenex Hfa Inhaler*)  2 puff INH Q6HR PRN


   PRN Reason: SHORTNESS OF BREATH


Levetiracetam (Keppra Tab*)  250 mg PO TID [facility]


   Last Admin: 01/05/19 21:24 Dose:  250 mg


Levothyroxine Sodium (Synthroid Tab*)  50 mcg PO DAILY@0600 [facility]


   Last Admin: 01/06/19 05:26 Dose:  50 mcg


Magnesium Hydroxide (Milk Of Magnesia Liq*)  30 ml PO BID PRN


   PRN Reason: CONSTIPATION


Omeprazole (Prilosec Cap*)  20 mg PO DAILY@0730 [facility]


   Last Admin: 01/06/19 08:10 Dose:  20 mg


Ondansetron HCl (Zofran Inj*)  4 mg IV Q4H PRN


   PRN Reason: NAUSEA


Oxycodone/Acetaminophen (Percocet 5/325 Tab*)  1 tab PO Q4H PRN


   PRN Reason: Pain


   Last Admin: 01/06/19 05:26 Dose:  1 tab


Potassium Chloride (Klor Con Er Tab*)  10 meq PO BID [facility]


   Last Admin: 01/05/19 21:23 Dose:  10 meq


Prednisone (Deltasone Tab*)  10 mg PO DAILY [facility]


   Last Admin: 01/05/19 12:08 Dose:  10 mg


Primidone (Mysoline Tab(*))  150 mg PO BEDTIME [facility]


   Last Admin: 01/05/19 21:25 Dose:  150 mg


Venlafaxine HCl (Effexor Xr Cap*)  225 mg PO DAILY [facility]


   Last Admin: 01/05/19 12:09 Dose:  225 mg








 Vital Signs - 8 hr











  01/06/19 01/06/19 01/06/19





  01:16 01:18 03:49


 


Temperature   98.9 F


 


Pulse Rate   84


 


Respiratory  16 16





Rate   


 


Blood Pressure   120/56





(mmHg)   


 


O2 Sat by Pulse 100  100





Oximetry   














  01/06/19 01/06/19 01/06/19





  05:26 07:07 07:54


 


Temperature   97.9 F


 


Pulse Rate  77 73


 


Respiratory 16 15 16





Rate   


 


Blood Pressure   104/49





(mmHg)   


 


O2 Sat by Pulse  99 100





Oximetry   














  01/06/19





  08:07


 


Temperature 


 


Pulse Rate 


 


Respiratory 18





Rate 


 


Blood Pressure 





(mmHg) 


 


O2 Sat by Pulse 





Oximetry 











Oxygen Devices in Use Now: Nasal Cannula


Appearance: 79 yo female A+Ox3 in NAD


Eyes: No Scleral Icterus, PERRLA


Ears/Nose/Mouth/Throat: Mucous Membranes Moist, - - dentures


Neck: NL Appearance and Movements; NL JVP


Respiratory: Symmetrical Chest Expansion and Respiratory Effort, Clear to 

Auscultation


Cardiovascular: NL Sounds; No Murmurs; No JVD, RRR, No Edema


Abdominal: NL Sounds; No Tenderness; No Distention


Skin: - - right hip dressing intact - 


Neurological: Alert and Oriented x 3, NL Sensation


Result Diagrams: 


 01/06/19 05:14





 01/06/19 05:14


Additional Lab and Data: 


 Lab Results











  01/02/19 01/02/19 01/02/19 Range/Units





  14:59 14:59 14:59 


 


WBC  12.0 H    (3.5-10.8)  10^3/ul


 


RBC  4.00    (4.00-5.40)  10^6/ul


 


Hgb  12.5    (12.0-16.0)  g/dl


 


Hct  38    (35-47)  %


 


MCV  95    (80-97)  fL


 


MCH  31    (27-31)  pg


 


MCHC  33    (31-36)  g/dl


 


RDW  14    (10.5-15)  %


 


Plt Count  163    (150-450)  10^3/ul


 


MPV  7.8    (7.4-10.4)  fL


 


Neut % (Auto)  80.9    %


 


Lymph % (Auto)  7.3    %


 


Mono % (Auto)  8.1    %


 


Eos % (Auto)  3.1    %


 


Baso % (Auto)  0.6    %


 


Absolute Neuts (auto)  9.7 H    (1.5-7.7)  10^3/ul


 


Absolute Lymphs (auto)  0.9 L    (1.0-4.8)  10^3/ul


 


Absolute Monos (auto)  1.0 H    (0-0.8)  10^3/ul


 


Absolute Eos (auto)  0.4    (0-0.6)  10^3/ul


 


Absolute Basos (auto)  0.1    (0-0.2)  10^3/ul


 


Absolute Nucleated RBC  0    10^3/ul


 


Nucleated RBC %  0.1    


 


INR (Anticoag Therapy)   0.96   (0.77-1.02)  


 


APTT   26.0   (26.0-36.3)  seconds


 


Sodium    138  (135-145)  mmol/L


 


Potassium    4.4  (3.5-5.0)  mmol/L


 


Chloride    101  (101-111)  mmol/L


 


Carbon Dioxide    33 H  (22-32)  mmol/L


 


Anion Gap    4  (2-11)  mmol/L


 


BUN    14  (6-24)  mg/dL


 


Creatinine    0.76  (0.51-0.95)  mg/dL


 


Est GFR ( Amer)    88.8  (>60)  


 


Est GFR (Non-Af Amer)    73.4  (>60)  


 


BUN/Creatinine Ratio    18.4  (8-20)  


 


Glucose    101 H  ()  mg/dL


 


Lactic Acid     (0.5-2.0)  mmol/L


 


Calcium    9.1  (8.6-10.3)  mg/dL


 


Total Bilirubin    0.70  (0.2-1.0)  mg/dL


 


AST    22  (13-39)  U/L


 


ALT    16  (7-52)  U/L


 


Alkaline Phosphatase    70  ()  U/L


 


Troponin I    0.02  (<0.04)  ng/mL


 


Total Protein    5.7 L  (6.4-8.9)  g/dL


 


Albumin    3.2  (3.2-5.2)  g/dL


 


Globulin    2.5  (2-4)  g/dL


 


Albumin/Globulin Ratio    1.3  (1-3)  


 


Blood Type     


 


Antibody Screen     














  01/02/19 01/02/19 Range/Units





  14:59 14:59 


 


WBC    (3.5-10.8)  10^3/ul


 


RBC    (4.00-5.40)  10^6/ul


 


Hgb    (12.0-16.0)  g/dl


 


Hct    (35-47)  %


 


MCV    (80-97)  fL


 


MCH    (27-31)  pg


 


MCHC    (31-36)  g/dl


 


RDW    (10.5-15)  %


 


Plt Count    (150-450)  10^3/ul


 


MPV    (7.4-10.4)  fL


 


Neut % (Auto)    %


 


Lymph % (Auto)    %


 


Mono % (Auto)    %


 


Eos % (Auto)    %


 


Baso % (Auto)    %


 


Absolute Neuts (auto)    (1.5-7.7)  10^3/ul


 


Absolute Lymphs (auto)    (1.0-4.8)  10^3/ul


 


Absolute Monos (auto)    (0-0.8)  10^3/ul


 


Absolute Eos (auto)    (0-0.6)  10^3/ul


 


Absolute Basos (auto)    (0-0.2)  10^3/ul


 


Absolute Nucleated RBC    10^3/ul


 


Nucleated RBC %    


 


INR (Anticoag Therapy)    (0.77-1.02)  


 


APTT    (26.0-36.3)  seconds


 


Sodium    (135-145)  mmol/L


 


Potassium    (3.5-5.0)  mmol/L


 


Chloride    (101-111)  mmol/L


 


Carbon Dioxide    (22-32)  mmol/L


 


Anion Gap    (2-11)  mmol/L


 


BUN    (6-24)  mg/dL


 


Creatinine    (0.51-0.95)  mg/dL


 


Est GFR ( Amer)    (>60)  


 


Est GFR (Non-Af Amer)    (>60)  


 


BUN/Creatinine Ratio    (8-20)  


 


Glucose    ()  mg/dL


 


Lactic Acid  1.6   (0.5-2.0)  mmol/L


 


Calcium    (8.6-10.3)  mg/dL


 


Total Bilirubin    (0.2-1.0)  mg/dL


 


AST    (13-39)  U/L


 


ALT    (7-52)  U/L


 


Alkaline Phosphatase    ()  U/L


 


Troponin I    (<0.04)  ng/mL


 


Total Protein    (6.4-8.9)  g/dL


 


Albumin    (3.2-5.2)  g/dL


 


Globulin    (2-4)  g/dL


 


Albumin/Globulin Ratio    (1-3)  


 


Blood Type   A Positive  


 


Antibody Screen   Negative  











Microbiology and Other Data: 


 Microbiology











 01/02/19 20:10 Urine Culture - Final





 Urine    Escherichia Coli


 


 01/03/19 14:53 Influenza Types A,B Antigen - Final





 Nasal    Specimen received for Influenza A/B Molecular testing














Assess/Plan/Problems-Billing


Assessment: 





Pt is a 78 yof with a history of HTN, CHF, and COPD, who presented to the ER s/

p fall.  Pt was found to have a R femur fracture.





- Patient Problems


(1) Femur fracture, right


Comment: 


- Dispo per Dr. Franklin - POD #1 s/p right gamma nail


- WBAT


- PT/OT


- Pain management with bowel regimen   





(2) Chronic systolic heart failure


Comment: 


- stable, appears well compensated. Stop post-op IVFs


-Echo- LV EF is 45-50%


-Hold Demadex in post op phase - soft BP this am


- Daily weights   





(3) Afib


Comment: 


- s/p pacer


- continue Digoxin (Dig level wnls)   





(4) Chronic obstructive pulmonary disease


Comment: 


-Stable


-Continue mucinex, incentive spirometer


-Continue pulmicort, perforomist, Xopenex, and prednisone, as ordered at home   





(5) Coronary artery disease


Comment: 


- s/p stent placement


-Continue crestor


-Hold aspirin and plavix for surgery, restarting when cleared by Ortho Team   





(6) GERD (gastroesophageal reflux disease)


Comment: 


-Continue Omeprazole    





(7) Hypothyroid


Comment: 


-Continue levothyroxine   





(8) Urinary tract infection


Comment: 


- no leukocytosis, afebrile


-Ecoli - sensitive to ceftriaxone    





(9) DVT prophylaxis


Comment: 


-Lovenox   





(10) Full code status





Status and Disposition: 





Inpatient.  Discharge home after surgery when stable.

## 2019-01-07 LAB
ANION GAP SERPL CALC-SCNC: 2 MMOL/L (ref 2–11)
BASOPHILS # BLD AUTO: 0 10^3/UL (ref 0–0.2)
BUN SERPL-MCNC: 13 MG/DL (ref 6–24)
BUN/CREAT SERPL: 23.6 (ref 8–20)
CALCIUM SERPL-MCNC: 8.2 MG/DL (ref 8.6–10.3)
CHLORIDE SERPL-SCNC: 102 MMOL/L (ref 101–111)
EOSINOPHIL # BLD AUTO: 0.4 10^3/UL (ref 0–0.6)
GLUCOSE SERPL-MCNC: 98 MG/DL (ref 70–100)
HCO3 SERPL-SCNC: 34 MMOL/L (ref 22–32)
HCT VFR BLD AUTO: 31 % (ref 35–47)
HGB BLD-MCNC: 10.3 G/DL (ref 12–16)
LYMPHOCYTES # BLD AUTO: 1.1 10^3/UL (ref 1–4.8)
MCH RBC QN AUTO: 32 PG (ref 27–31)
MCHC RBC AUTO-ENTMCNC: 34 G/DL (ref 31–36)
MCV RBC AUTO: 95 FL (ref 80–97)
MONOCYTES # BLD AUTO: 1.3 10^3/UL (ref 0–0.8)
NEUTROPHILS # BLD AUTO: 6.5 10^3/UL (ref 1.5–7.7)
NRBC # BLD AUTO: 0 10^3/UL
NRBC BLD QL AUTO: 0
PLATELET # BLD AUTO: 177 10^3/UL (ref 150–450)
POTASSIUM SERPL-SCNC: 5 MMOL/L (ref 3.5–5)
RBC # BLD AUTO: 3.22 10^6/UL (ref 4–5.4)
SODIUM SERPL-SCNC: 138 MMOL/L (ref 135–145)
WBC # BLD AUTO: 9.3 10^3/UL (ref 3.5–10.8)

## 2019-01-07 RX ADMIN — LEVALBUTEROL HYDROCHLORIDE SCH MG: 0.63 SOLUTION RESPIRATORY (INHALATION) at 17:25

## 2019-01-07 RX ADMIN — LEVETIRACETAM SCH MG: 500 TABLET, FILM COATED ORAL at 20:24

## 2019-01-07 RX ADMIN — LEVETIRACETAM SCH MG: 500 TABLET, FILM COATED ORAL at 08:44

## 2019-01-07 RX ADMIN — GUAIFENESIN SCH MG: 600 TABLET, EXTENDED RELEASE ORAL at 20:23

## 2019-01-07 RX ADMIN — BUDESONIDE SCH MG: 0.5 SUSPENSION RESPIRATORY (INHALATION) at 08:27

## 2019-01-07 RX ADMIN — FORMOTEROL FUMARATE DIHYDRATE SCH MCG: 20 SOLUTION RESPIRATORY (INHALATION) at 08:27

## 2019-01-07 RX ADMIN — OXYCODONE HYDROCHLORIDE AND ACETAMINOPHEN PRN TAB: 5; 325 TABLET ORAL at 20:23

## 2019-01-07 RX ADMIN — PRIMIDONE SCH MG: 50 TABLET ORAL at 20:24

## 2019-01-07 RX ADMIN — CEFTRIAXONE SODIUM SCH MLS/HR: 1 INJECTION, POWDER, FOR SOLUTION INTRAVENOUS at 15:53

## 2019-01-07 RX ADMIN — FORMOTEROL FUMARATE DIHYDRATE SCH MCG: 20 SOLUTION RESPIRATORY (INHALATION) at 19:36

## 2019-01-07 RX ADMIN — OMEPRAZOLE SCH MG: 20 CAPSULE, DELAYED RELEASE ORAL at 07:49

## 2019-01-07 RX ADMIN — PREDNISONE SCH MG: 10 TABLET ORAL at 08:45

## 2019-01-07 RX ADMIN — DONEPEZIL HYDROCHLORIDE SCH MG: 5 TABLET, FILM COATED ORAL at 20:23

## 2019-01-07 RX ADMIN — LEVETIRACETAM SCH MG: 500 TABLET, FILM COATED ORAL at 14:30

## 2019-01-07 RX ADMIN — ATORVASTATIN CALCIUM SCH MG: 20 TABLET, FILM COATED ORAL at 08:43

## 2019-01-07 RX ADMIN — FERROUS SULFATE TAB 325 MG (65 MG ELEMENTAL FE) SCH MG: 325 (65 FE) TAB at 08:44

## 2019-01-07 RX ADMIN — VENLAFAXINE HYDROCHLORIDE SCH MG: 75 CAPSULE, EXTENDED RELEASE ORAL at 08:44

## 2019-01-07 RX ADMIN — DOCUSATE SODIUM SCH MG: 100 CAPSULE, LIQUID FILLED ORAL at 08:44

## 2019-01-07 RX ADMIN — LEVOTHYROXINE SODIUM SCH MCG: 50 TABLET ORAL at 06:07

## 2019-01-07 RX ADMIN — ENOXAPARIN SODIUM SCH MG: 40 INJECTION SUBCUTANEOUS at 08:45

## 2019-01-07 RX ADMIN — DIGOXIN SCH MG: 125 TABLET ORAL at 08:44

## 2019-01-07 RX ADMIN — POTASSIUM CHLORIDE SCH MEQ: 750 TABLET, FILM COATED, EXTENDED RELEASE ORAL at 20:23

## 2019-01-07 RX ADMIN — BUDESONIDE SCH MG: 0.5 SUSPENSION RESPIRATORY (INHALATION) at 19:36

## 2019-01-07 RX ADMIN — OXYCODONE HYDROCHLORIDE AND ACETAMINOPHEN PRN TAB: 5; 325 TABLET ORAL at 06:07

## 2019-01-07 RX ADMIN — POTASSIUM CHLORIDE SCH MEQ: 750 TABLET, FILM COATED, EXTENDED RELEASE ORAL at 08:44

## 2019-01-07 RX ADMIN — OXYCODONE HYDROCHLORIDE AND ACETAMINOPHEN PRN TAB: 5; 325 TABLET ORAL at 12:06

## 2019-01-07 RX ADMIN — GUAIFENESIN SCH MG: 600 TABLET, EXTENDED RELEASE ORAL at 08:44

## 2019-01-07 NOTE — PN
Subjective


Date of Service: 01/07/19


Interval History: 





Pt is POD2.  She states that she has pain in the R hip with movement, but is 

otherwise comfortable.  Transferring from the bed to the chair was slightly 

less painful today than yesterday.  Denies changes in cough or shortness of 

breath, palpitations, denies fever/chills/sweats.  





Objective


Active Medications: 








Acetaminophen (Tylenol Tab*)  650 mg PO Q4H PRN


   PRN Reason: FEVER/PAIN


   Last Admin: 01/04/19 21:32 Dose:  650 mg


Atorvastatin Calcium (Lipitor*)  20 mg PO DAILY [facility]; Protocol


   Last Admin: 01/07/19 08:43 Dose:  20 mg


Budesonide (Pulmicort Neb*)  0.5 mg INH BID [facility]


   Last Admin: 01/07/19 08:27 Dose:  0.5 mg


Digoxin (Lanoxin Tab*)  0.125 mg PO DAILY [facility]


   Last Admin: 01/07/19 08:44 Dose:  0.125 mg


Docusate Sodium (Colace Cap*)  200 mg PO DAILY [facility]


   Last Admin: 01/07/19 08:44 Dose:  200 mg


Donepezil HCl (Aricept Tab*)  10 mg PO BEDTIME [facility]


   Last Admin: 01/06/19 21:34 Dose:  10 mg


Enoxaparin Sodium (Lovenox(*))  40 mg SUBCUT DAILY [facility]


   Last Admin: 01/07/19 08:45 Dose:  40 mg


Ferrous Sulfate (Ferrous Sulfate Tab*)  325 mg PO DAILY [facility]


   Last Admin: 01/07/19 08:44 Dose:  325 mg


Formoterol Fumarate (Perforomist Neb.Soln*(Nf))  20 mcg INH BID [facility]


   Last Admin: 01/07/19 08:27 Dose:  20 mcg


Guaifenesin (Mucinex*)  600 mg PO BID [facility]


   Last Admin: 01/07/19 08:44 Dose:  600 mg


Ceftriaxone Sodium 1 gm/ (Sodium Chloride)  50 mls @ 200 mls/hr IVPB Q24H [facility]


   Stop: 01/08/19 15:00


   Last Admin: 01/06/19 15:44 Dose:  200 mls/hr


Lactulose (Lactulose*)  30 ml PO DAILY PRN


   PRN Reason: CONSTIPATION


   Last Admin: 01/04/19 12:10 Dose:  30 ml


Levalbuterol HCl (Xopenex 0.63mg/3ml Neb*)  0.63 mg INH 1700 [facility]


   Last Admin: 01/06/19 16:53 Dose:  0.63 mg


Levalbuterol HCl (Xopenex Hfa Inhaler*)  2 puff INH Q6HR PRN


   PRN Reason: SHORTNESS OF BREATH


Levetiracetam (Keppra Tab*)  250 mg PO TID [facility]


   Last Admin: 01/07/19 08:44 Dose:  250 mg


Levothyroxine Sodium (Synthroid Tab*)  50 mcg PO DAILY@0600 [facility]


   Last Admin: 01/07/19 06:07 Dose:  50 mcg


Magnesium Hydroxide (Milk Of Magnesia Liq*)  30 ml PO BID PRN


   PRN Reason: CONSTIPATION


   Last Admin: 01/06/19 09:11 Dose:  30 ml


Omeprazole (Prilosec Cap*)  20 mg PO DAILY@0730 [facility]


   Last Admin: 01/07/19 07:49 Dose:  20 mg


Ondansetron HCl (Zofran Inj*)  4 mg IV Q4H PRN


   PRN Reason: NAUSEA


Oxycodone/Acetaminophen (Percocet 5/325 Tab*)  1 tab PO Q4H PRN


   PRN Reason: Pain


   Last Admin: 01/07/19 06:07 Dose:  1 tab


Potassium Chloride (Klor Con Er Tab*)  10 meq PO BID [facility]


   Last Admin: 01/07/19 08:44 Dose:  10 meq


Prednisone (Deltasone Tab*)  10 mg PO DAILY [facility]


   Last Admin: 01/07/19 08:45 Dose:  10 mg


Primidone (Mysoline Tab(*))  150 mg PO BEDTIME [facility]


   Last Admin: 01/06/19 21:39 Dose:  150 mg


Venlafaxine HCl (Effexor Xr Cap*)  225 mg PO DAILY [facility]


   Last Admin: 01/07/19 08:44 Dose:  225 mg








 Vital Signs - 8 hr











  01/07/19 01/07/19 01/07/19





  04:36 06:07 07:32


 


Temperature 98.5 F  97.7 F


 


Pulse Rate 78  72


 


Respiratory 18 16 18





Rate   


 


Blood Pressure 108/49  106/50





(mmHg)   


 


O2 Sat by Pulse 100  99





Oximetry   














  01/07/19 01/07/19 01/07/19





  07:51 08:30 08:44


 


Temperature   


 


Pulse Rate  72 72


 


Respiratory 16 14 





Rate   


 


Blood Pressure   





(mmHg)   


 


O2 Sat by Pulse 100 98 





Oximetry   














  01/07/19





  08:45


 


Temperature 


 


Pulse Rate 


 


Respiratory 20





Rate 


 


Blood Pressure 





(mmHg) 


 


O2 Sat by Pulse 





Oximetry 











Oxygen Devices in Use Now: Nasal Cannula


Appearance: Pt is sitting upright in her chair; in no acute distress


Eyes: No Scleral Icterus, PERRLA


Ears/Nose/Mouth/Throat: NL Teeth, Lips, Gums, Mucous Membranes Moist


Neck: NL Appearance and Movements; NL JVP, Trachea Midline


Respiratory: Symmetrical Chest Expansion and Respiratory Effort, Clear to 

Auscultation, - - Decreased breath sounds b/l.


Cardiovascular: NL Sounds; No Murmurs; No JVD, RRR, No Edema


Abdominal: NL Sounds; No Tenderness; No Distention


Extremities: No Edema, No Clubbing, Cyanosis, - - Pedal pulses 2+ b/l; 

sensation intact; capillary refill <2sec


Skin: No Rash or Ulcers


Neurological: Alert and Oriented x 3, NL Sensation


Result Diagrams: 


 01/07/19 05:32





 01/07/19 05:32


Additional Lab and Data: 


 Laboratory Last Values











WBC  9.3 10^3/ul (3.5-10.8)   01/07/19  05:32    


 


RBC  3.22 10^6/ul (4.00-5.40)  L  01/07/19  05:32    


 


Hgb  10.3 g/dl (12.0-16.0)  L  01/07/19  05:32    


 


Hct  31 % (35-47)  L  01/07/19  05:32    


 


MCV  95 fL (80-97)   01/07/19  05:32    


 


MCH  32 pg (27-31)  H  01/07/19  05:32    


 


MCHC  34 g/dl (31-36)   01/07/19  05:32    


 


RDW  14 % (10.5-15)   01/07/19  05:32    


 


Plt Count  177 10^3/ul (150-450)   01/07/19  05:32    


 


MPV  8.0 fL (7.4-10.4)   01/07/19  05:32    


 


Neut % (Auto)  70.0 %  01/07/19  05:32    


 


Lymph % (Auto)  11.4 %  01/07/19  05:32    


 


Mono % (Auto)  14.3 %  01/07/19  05:32    


 


Eos % (Auto)  3.9 %  01/07/19  05:32    


 


Baso % (Auto)  0.4 %  01/07/19  05:32    


 


Absolute Neuts (auto)  6.5 10^3/ul (1.5-7.7)   01/07/19  05:32    


 


Absolute Lymphs (auto)  1.1 10^3/ul (1.0-4.8)   01/07/19  05:32    


 


Absolute Monos (auto)  1.3 10^3/ul (0-0.8)  H  01/07/19  05:32    


 


Absolute Eos (auto)  0.4 10^3/ul (0-0.6)   01/07/19  05:32    


 


Absolute Basos (auto)  0 10^3/ul (0-0.2)   01/07/19  05:32    


 


Absolute Nucleated RBC  0 10^3/ul  01/07/19  05:32    


 


Nucleated RBC %  0   01/07/19  05:32    


 


INR (Anticoag Therapy)  0.98  (0.77-1.02)   01/03/19  04:37    


 


APTT  26.0 seconds (26.0-36.3)   01/02/19  14:59    


 


Sodium  138 mmol/L (135-145)   01/07/19  05:32    


 


Potassium  5.0 mmol/L (3.5-5.0)   01/07/19  05:32    


 


Chloride  102 mmol/L (101-111)   01/07/19  05:32    


 


Carbon Dioxide  34 mmol/L (22-32)  H  01/07/19  05:32    


 


Anion Gap  2 mmol/L (2-11)   01/07/19  05:32    


 


BUN  13 mg/dL (6-24)   01/07/19  05:32    


 


Creatinine  0.55 mg/dL (0.51-0.95)   01/07/19  05:32    


 


Est GFR ( Amer)  129.0  (>60)   01/07/19  05:32    


 


Est GFR (Non-Af Amer)  106.6  (>60)   01/07/19  05:32    


 


BUN/Creatinine Ratio  23.6  (8-20)  H  01/07/19  05:32    


 


Glucose  98 mg/dL ()   01/07/19  05:32    


 


Lactic Acid  1.6 mmol/L (0.5-2.0)   01/02/19  14:59    


 


Calcium  8.2 mg/dL (8.6-10.3)  L  01/07/19  05:32    


 


Total Bilirubin  0.70 mg/dL (0.2-1.0)   01/02/19  14:59    


 


AST  22 U/L (13-39)   01/02/19  14:59    


 


ALT  16 U/L (7-52)   01/02/19  14:59    


 


Alkaline Phosphatase  70 U/L ()   01/02/19  14:59    


 


Troponin I  0.02 ng/mL (<0.04)   01/02/19  14:59    


 


Total Protein  5.7 g/dL (6.4-8.9)  L  01/02/19  14:59    


 


Albumin  3.2 g/dL (3.2-5.2)   01/02/19  14:59    


 


Globulin  2.5 g/dL (2-4)   01/02/19  14:59    


 


Albumin/Globulin Ratio  1.3  (1-3)   01/02/19  14:59    


 


Urine Color  Rosy   01/02/19  20:10    


 


Urine Appearance  Cloudy   01/02/19  20:10    


 


Urine pH  6.0  (5-9)   01/02/19  20:10    


 


Ur Specific Gravity  1.016  (1.010-1.030)   01/02/19  20:10    


 


Urine Protein  Negative  (Negative)   01/02/19  20:10    


 


Urine Ketones  Negative  (Negative)   01/02/19  20:10    


 


Urine Blood  2+  (Negative)  A  01/02/19  20:10    


 


Urine Nitrate  Negative  (Negative)   01/02/19  20:10    


 


Urine Bilirubin  Negative  (Negative)   01/02/19  20:10    


 


Urine Urobilinogen  Negative  (Negative)   01/02/19  20:10    


 


Ur Leukocyte Esterase  3+  (Negative)  A  01/02/19  20:10    


 


Urine WBC (Auto)  3+(>20/hpf)  (Absent)  A  01/02/19  20:10    


 


Urine RBC (Auto)  2+(6-10/hpf)  (Absent)  A  01/02/19  20:10    


 


Urine Bacteria  Absent  (Absent)   01/02/19  20:10    


 


Urine Glucose  Negative  (Negative)   01/02/19  20:10    


 


Digoxin  0.8 ng/ml (0.8-2.0)   01/02/19  14:59    


 


Influenza A (Rapid)  Negative  (Negative)   01/03/19  15:09    


 


Influenza B (Rapid)  Negative  (Negative)   01/03/19  15:09    


 


Blood Type  A Positive   01/02/19  14:59    


 


Antibody Screen  Negative   01/02/19  14:59    














Assess/Plan/Problems-Billing


Assessment: 





Pt is a 78 yof with a history of HTN, CHF, and COPD, who presented to the ER s/

p fall.  Pt was found to have a R femur fracture.





- Patient Problems


(1) Femur fracture, right


Comment: 


- Dispo per Dr. Franklin - POD #2 s/p right gamma nail


- WBAT


- PT/OT


- Pain management with bowel regimen   





(2) Afib


Comment: 


- s/p pacer


- continue Digoxin (Dig level wnls)   





(3) Chronic systolic heart failure


Comment: 


-Stable, tolerating PO well; without LE edema


-Echo- LV EF is 45-50%


-Continue to hold Demadex and IVF


-Daily weights   





(4) Urinary tract infection


Comment: 


-No leukocytosis, afebrile


-Discontinue Ceftriaxone   





(5) Chronic obstructive pulmonary disease


Comment: 


-Stable


-Continue mucinex, incentive spirometer


-Continue pulmicort, perforomist, Xopenex, and prednisone, as ordered at home   





(6) Postoperative anemia due to acute blood loss


Comment: 


-Hgb 10.3; no need to transfuse


-Continue to monitor   





(7) Coronary artery disease


Comment: 


-s/p stent placement


-Continue crestor


-Hold aspirin and plavix for surgery, restarting when cleared by Ortho Team   





(8) Hypothyroid


Comment: 


-Continue levothyroxine   





(9) GERD (gastroesophageal reflux disease)


Comment: 


-Continue Omeprazole    





(10) DVT prophylaxis


Comment: 


-Lovenox   





(11) Full code status





Status and Disposition: 





Inpatient.  Discharge home after surgery when stable.

## 2019-01-07 NOTE — PN
Progress Note





- Progress Note


Date of Service: 01/07/19


SOAP: 


Subjective:


[]Patient was seen and examined at bedside today. She is feeling well without 

right hip pain at rest. Denies CP, SOB, dizziness, nausea.





Objective:


[]General: NAD, appears well


RLE: Right hip dressing changed, incisions are CDI without surrounding 

erythema. Thigh is soft. DF/PF intact, sensation intact to light touch distally

, DP2+


Calves are supple and nontender without erythema, edema or palpable cords 





Assessment:


[] s/p right gamma nail; POD #2





Plan:


1) PT/OT-WBAT


2) dry sterile dressing change daily


3) Lovenox 40 mg sq qd x 30 days post op/SCD's for DVT prophylaxis


4) PMRU referral in





 Vital Signs











Temp  97.7 F   01/07/19 07:32


 


Pulse  72   01/07/19 08:44


 


Resp  20   01/07/19 08:45


 


BP  106/50   01/07/19 07:32


 


Pulse Ox  98   01/07/19 08:30








 Intake & Output











 01/06/19 01/07/19 01/07/19





 18:59 06:59 18:59


 


Intake Total 1793 320 680


 


Output Total 750 1050 


 


Balance 1043 -730 680


 


Intake:   


 


  IV Fluids 593  


 


    ABX - CEFAZOLIN 55  


 


    ABX - CEFTRIAXONE 55  


 


      


 


  Oral 1200 320 680


 


Output:   


 


  [address] 750 1050 








 Laboratory Last Values











WBC  9.3 10^3/ul (3.5-10.8)   01/07/19  05:32    


 


RBC  3.22 10^6/ul (4.00-5.40)  L  01/07/19  05:32    


 


Hgb  10.3 g/dl (12.0-16.0)  L  01/07/19  05:32    


 


Hct  31 % (35-47)  L  01/07/19  05:32    


 


MCV  95 fL (80-97)   01/07/19  05:32    


 


MCH  32 pg (27-31)  H  01/07/19  05:32    


 


MCHC  34 g/dl (31-36)   01/07/19  05:32    


 


RDW  14 % (10.5-15)   01/07/19  05:32    


 


Plt Count  177 10^3/ul (150-450)   01/07/19  05:32    


 


MPV  8.0 fL (7.4-10.4)   01/07/19  05:32    


 


Neut % (Auto)  70.0 %  01/07/19  05:32    


 


Lymph % (Auto)  11.4 %  01/07/19  05:32    


 


Mono % (Auto)  14.3 %  01/07/19  05:32    


 


Eos % (Auto)  3.9 %  01/07/19  05:32    


 


Baso % (Auto)  0.4 %  01/07/19  05:32    


 


Absolute Neuts (auto)  6.5 10^3/ul (1.5-7.7)   01/07/19  05:32    


 


Absolute Lymphs (auto)  1.1 10^3/ul (1.0-4.8)   01/07/19  05:32    


 


Absolute Monos (auto)  1.3 10^3/ul (0-0.8)  H  01/07/19  05:32    


 


Absolute Eos (auto)  0.4 10^3/ul (0-0.6)   01/07/19  05:32    


 


Absolute Basos (auto)  0 10^3/ul (0-0.2)   01/07/19  05:32    


 


Absolute Nucleated RBC  0 10^3/ul  01/07/19  05:32    


 


Nucleated RBC %  0   01/07/19  05:32    


 


INR (Anticoag Therapy)  0.98  (0.77-1.02)   01/03/19  04:37    


 


APTT  26.0 seconds (26.0-36.3)   01/02/19  14:59    


 


Sodium  138 mmol/L (135-145)   01/07/19  05:32    


 


Potassium  5.0 mmol/L (3.5-5.0)   01/07/19  05:32    


 


Chloride  102 mmol/L (101-111)   01/07/19  05:32    


 


Carbon Dioxide  34 mmol/L (22-32)  H  01/07/19  05:32    


 


Anion Gap  2 mmol/L (2-11)   01/07/19  05:32    


 


BUN  13 mg/dL (6-24)   01/07/19  05:32    


 


Creatinine  0.55 mg/dL (0.51-0.95)   01/07/19  05:32    


 


Est GFR ( Amer)  129.0  (>60)   01/07/19  05:32    


 


Est GFR (Non-Af Amer)  106.6  (>60)   01/07/19  05:32    


 


BUN/Creatinine Ratio  23.6  (8-20)  H  01/07/19  05:32    


 


Glucose  98 mg/dL ()   01/07/19  05:32    


 


Lactic Acid  1.6 mmol/L (0.5-2.0)   01/02/19  14:59    


 


Calcium  8.2 mg/dL (8.6-10.3)  L  01/07/19  05:32    


 


Total Bilirubin  0.70 mg/dL (0.2-1.0)   01/02/19  14:59    


 


AST  22 U/L (13-39)   01/02/19  14:59    


 


ALT  16 U/L (7-52)   01/02/19  14:59    


 


Alkaline Phosphatase  70 U/L ()   01/02/19  14:59    


 


Troponin I  0.02 ng/mL (<0.04)   01/02/19  14:59    


 


Total Protein  5.7 g/dL (6.4-8.9)  L  01/02/19  14:59    


 


Albumin  3.2 g/dL (3.2-5.2)   01/02/19  14:59    


 


Globulin  2.5 g/dL (2-4)   01/02/19  14:59    


 


Albumin/Globulin Ratio  1.3  (1-3)   01/02/19  14:59    


 


Urine Color  Rosy   01/02/19  20:10    


 


Urine Appearance  Cloudy   01/02/19  20:10    


 


Urine pH  6.0  (5-9)   01/02/19  20:10    


 


Ur Specific Gravity  1.016  (1.010-1.030)   01/02/19  20:10    


 


Urine Protein  Negative  (Negative)   01/02/19  20:10    


 


Urine Ketones  Negative  (Negative)   01/02/19  20:10    


 


Urine Blood  2+  (Negative)  A  01/02/19  20:10    


 


Urine Nitrate  Negative  (Negative)   01/02/19  20:10    


 


Urine Bilirubin  Negative  (Negative)   01/02/19  20:10    


 


Urine Urobilinogen  Negative  (Negative)   01/02/19  20:10    


 


Ur Leukocyte Esterase  3+  (Negative)  A  01/02/19  20:10    


 


Urine WBC (Auto)  3+(>20/hpf)  (Absent)  A  01/02/19  20:10    


 


Urine RBC (Auto)  2+(6-10/hpf)  (Absent)  A  01/02/19  20:10    


 


Urine Bacteria  Absent  (Absent)   01/02/19  20:10    


 


Urine Glucose  Negative  (Negative)   01/02/19  20:10    


 


Digoxin  0.8 ng/ml (0.8-2.0)   01/02/19  14:59    


 


Influenza A (Rapid)  Negative  (Negative)   01/03/19  15:09    


 


Influenza B (Rapid)  Negative  (Negative)   01/03/19  15:09    


 


Blood Type  A Positive   01/02/19  14:59    


 


Antibody Screen  Negative   01/02/19  14:59

## 2019-01-08 LAB
BASOPHILS # BLD AUTO: 0 10^3/UL (ref 0–0.2)
EOSINOPHIL # BLD AUTO: 0.3 10^3/UL (ref 0–0.6)
HCT VFR BLD AUTO: 30 % (ref 35–47)
HGB BLD-MCNC: 9.9 G/DL (ref 12–16)
LYMPHOCYTES # BLD AUTO: 1.3 10^3/UL (ref 1–4.8)
MCH RBC QN AUTO: 31 PG (ref 27–31)
MCHC RBC AUTO-ENTMCNC: 33 G/DL (ref 31–36)
MCV RBC AUTO: 95 FL (ref 80–97)
MONOCYTES # BLD AUTO: 1.2 10^3/UL (ref 0–0.8)
NEUTROPHILS # BLD AUTO: 5.4 10^3/UL (ref 1.5–7.7)
NRBC # BLD AUTO: 0 10^3/UL
NRBC BLD QL AUTO: 0
PLATELET # BLD AUTO: 204 10^3/UL (ref 150–450)
RBC # BLD AUTO: 3.19 10^6/UL (ref 4–5.4)
WBC # BLD AUTO: 8.2 10^3/UL (ref 3.5–10.8)

## 2019-01-08 RX ADMIN — BUDESONIDE SCH MG: 0.5 SUSPENSION RESPIRATORY (INHALATION) at 07:23

## 2019-01-08 RX ADMIN — LEVETIRACETAM SCH MG: 500 TABLET, FILM COATED ORAL at 08:34

## 2019-01-08 RX ADMIN — POTASSIUM CHLORIDE SCH MEQ: 750 TABLET, FILM COATED, EXTENDED RELEASE ORAL at 08:34

## 2019-01-08 RX ADMIN — PREDNISONE SCH MG: 10 TABLET ORAL at 08:34

## 2019-01-08 RX ADMIN — FORMOTEROL FUMARATE DIHYDRATE SCH MCG: 20 SOLUTION RESPIRATORY (INHALATION) at 07:23

## 2019-01-08 RX ADMIN — FERROUS SULFATE TAB 325 MG (65 MG ELEMENTAL FE) SCH MG: 325 (65 FE) TAB at 08:34

## 2019-01-08 RX ADMIN — OXYCODONE HYDROCHLORIDE AND ACETAMINOPHEN PRN TAB: 5; 325 TABLET ORAL at 05:32

## 2019-01-08 RX ADMIN — LEVOTHYROXINE SODIUM SCH MCG: 50 TABLET ORAL at 05:32

## 2019-01-08 RX ADMIN — POTASSIUM CHLORIDE SCH MEQ: 750 TABLET, FILM COATED, EXTENDED RELEASE ORAL at 20:43

## 2019-01-08 RX ADMIN — BUDESONIDE SCH MG: 0.5 SUSPENSION RESPIRATORY (INHALATION) at 19:28

## 2019-01-08 RX ADMIN — DIGOXIN SCH MG: 125 TABLET ORAL at 08:34

## 2019-01-08 RX ADMIN — ENOXAPARIN SODIUM SCH MG: 40 INJECTION SUBCUTANEOUS at 08:33

## 2019-01-08 RX ADMIN — LEVALBUTEROL HYDROCHLORIDE SCH MG: 0.63 SOLUTION RESPIRATORY (INHALATION) at 18:05

## 2019-01-08 RX ADMIN — DOCUSATE SODIUM SCH MG: 100 CAPSULE, LIQUID FILLED ORAL at 08:34

## 2019-01-08 RX ADMIN — PRIMIDONE SCH MG: 50 TABLET ORAL at 20:42

## 2019-01-08 RX ADMIN — OXYCODONE HYDROCHLORIDE AND ACETAMINOPHEN PRN TAB: 5; 325 TABLET ORAL at 12:13

## 2019-01-08 RX ADMIN — DONEPEZIL HYDROCHLORIDE SCH MG: 5 TABLET, FILM COATED ORAL at 20:43

## 2019-01-08 RX ADMIN — OMEPRAZOLE SCH MG: 20 CAPSULE, DELAYED RELEASE ORAL at 07:43

## 2019-01-08 RX ADMIN — LEVETIRACETAM SCH MG: 500 TABLET, FILM COATED ORAL at 20:43

## 2019-01-08 RX ADMIN — LEVETIRACETAM SCH MG: 500 TABLET, FILM COATED ORAL at 13:57

## 2019-01-08 RX ADMIN — ATORVASTATIN CALCIUM SCH MG: 20 TABLET, FILM COATED ORAL at 08:34

## 2019-01-08 RX ADMIN — VENLAFAXINE HYDROCHLORIDE SCH MG: 75 CAPSULE, EXTENDED RELEASE ORAL at 08:33

## 2019-01-08 RX ADMIN — FORMOTEROL FUMARATE DIHYDRATE SCH MCG: 20 SOLUTION RESPIRATORY (INHALATION) at 19:30

## 2019-01-08 RX ADMIN — GUAIFENESIN SCH MG: 600 TABLET, EXTENDED RELEASE ORAL at 08:34

## 2019-01-08 RX ADMIN — GUAIFENESIN SCH MG: 600 TABLET, EXTENDED RELEASE ORAL at 20:44

## 2019-01-08 NOTE — PN
Progress Note





- Progress Note


Date of Service: 01/08/19


SOAP: 


Subjective:


[]Patient was seen and examined at bedside. She feels well without complaints 

today. Denies CP, SOB, dizziness, nausea. Progressing well with PT.








Objective:


[]General: NAD, appears well


RLE: Right hip dressing changed, incisions are CDI without surrounding 

erythema. Thigh is soft. DF/PF intact, sensation intact to light touch distally

, DP2+


Calves are supple and nontender without erythema, edema or palpable cords 





Assessment:


[] s/p right gamma nail





Plan:


1) PT/OT-WBAT


2) dry sterile dressing change daily


3) Lovenox 40 mg sq qd x 30 days post op/SCD's for DVT prophylaxis


4) PMRU referral in, if not accepted will need rehab placement. Ready for DC 

from ortho standpoint.








 Laboratory Last Values











WBC  8.2 10^3/ul (3.5-10.8)   01/08/19  04:47    


 


RBC  3.19 10^6/ul (4.00-5.40)  L  01/08/19  04:47    


 


Hgb  9.9 g/dl (12.0-16.0)  L  01/08/19  04:47    


 


Hct  30 % (35-47)  L  01/08/19  04:47    


 


MCV  95 fL (80-97)   01/08/19  04:47    


 


MCH  31 pg (27-31)   01/08/19  04:47    


 


MCHC  33 g/dl (31-36)   01/08/19  04:47    


 


RDW  14 % (10.5-15)   01/08/19  04:47    


 


Plt Count  204 10^3/ul (150-450)   01/08/19  04:47    


 


MPV  8.1 fL (7.4-10.4)   01/08/19  04:47    


 


Neut % (Auto)  65.7 %  01/08/19  04:47    


 


Lymph % (Auto)  15.6 %  01/08/19  04:47    


 


Mono % (Auto)  14.2 %  01/08/19  04:47    


 


Eos % (Auto)  4.1 %  01/08/19  04:47    


 


Baso % (Auto)  0.4 %  01/08/19  04:47    


 


Absolute Neuts (auto)  5.4 10^3/ul (1.5-7.7)   01/08/19  04:47    


 


Absolute Lymphs (auto)  1.3 10^3/ul (1.0-4.8)   01/08/19  04:47    


 


Absolute Monos (auto)  1.2 10^3/ul (0-0.8)  H  01/08/19  04:47    


 


Absolute Eos (auto)  0.3 10^3/ul (0-0.6)   01/08/19  04:47    


 


Absolute Basos (auto)  0 10^3/ul (0-0.2)   01/08/19  04:47    


 


Absolute Nucleated RBC  0 10^3/ul  01/08/19  04:47    


 


Nucleated RBC %  0   01/08/19  04:47    


 


INR (Anticoag Therapy)  0.98  (0.77-1.02)   01/03/19  04:37    


 


APTT  26.0 seconds (26.0-36.3)   01/02/19  14:59    


 


Sodium  138 mmol/L (135-145)   01/07/19  05:32    


 


Potassium  5.0 mmol/L (3.5-5.0)   01/07/19  05:32    


 


Chloride  102 mmol/L (101-111)   01/07/19  05:32    


 


Carbon Dioxide  34 mmol/L (22-32)  H  01/07/19  05:32    


 


Anion Gap  2 mmol/L (2-11)   01/07/19  05:32    


 


BUN  13 mg/dL (6-24)   01/07/19  05:32    


 


Creatinine  0.55 mg/dL (0.51-0.95)   01/07/19  05:32    


 


Est GFR ( Amer)  129.0  (>60)   01/07/19  05:32    


 


Est GFR (Non-Af Amer)  106.6  (>60)   01/07/19  05:32    


 


BUN/Creatinine Ratio  23.6  (8-20)  H  01/07/19  05:32    


 


Glucose  98 mg/dL ()   01/07/19  05:32    


 


Lactic Acid  1.6 mmol/L (0.5-2.0)   01/02/19  14:59    


 


Calcium  8.2 mg/dL (8.6-10.3)  L  01/07/19  05:32    


 


Total Bilirubin  0.70 mg/dL (0.2-1.0)   01/02/19  14:59    


 


AST  22 U/L (13-39)   01/02/19  14:59    


 


ALT  16 U/L (7-52)   01/02/19  14:59    


 


Alkaline Phosphatase  70 U/L ()   01/02/19  14:59    


 


Troponin I  0.02 ng/mL (<0.04)   01/02/19  14:59    


 


Total Protein  5.7 g/dL (6.4-8.9)  L  01/02/19  14:59    


 


Albumin  3.2 g/dL (3.2-5.2)   01/02/19  14:59    


 


Globulin  2.5 g/dL (2-4)   01/02/19  14:59    


 


Albumin/Globulin Ratio  1.3  (1-3)   01/02/19  14:59    


 


Urine Color  Rosy   01/02/19  20:10    


 


Urine Appearance  Cloudy   01/02/19  20:10    


 


Urine pH  6.0  (5-9)   01/02/19  20:10    


 


Ur Specific Gravity  1.016  (1.010-1.030)   01/02/19  20:10    


 


Urine Protein  Negative  (Negative)   01/02/19  20:10    


 


Urine Ketones  Negative  (Negative)   01/02/19  20:10    


 


Urine Blood  2+  (Negative)  A  01/02/19  20:10    


 


Urine Nitrate  Negative  (Negative)   01/02/19  20:10    


 


Urine Bilirubin  Negative  (Negative)   01/02/19  20:10    


 


Urine Urobilinogen  Negative  (Negative)   01/02/19  20:10    


 


Ur Leukocyte Esterase  3+  (Negative)  A  01/02/19  20:10    


 


Urine WBC (Auto)  3+(>20/hpf)  (Absent)  A  01/02/19  20:10    


 


Urine RBC (Auto)  2+(6-10/hpf)  (Absent)  A  01/02/19  20:10    


 


Urine Bacteria  Absent  (Absent)   01/02/19  20:10    


 


Urine Glucose  Negative  (Negative)   01/02/19  20:10    


 


Digoxin  0.8 ng/ml (0.8-2.0)   01/02/19  14:59    


 


Influenza A (Rapid)  Negative  (Negative)   01/03/19  15:09    


 


Influenza B (Rapid)  Negative  (Negative)   01/03/19  15:09    


 


Blood Type  A Positive   01/02/19  14:59    


 


Antibody Screen  Negative   01/02/19  14:59    








 Vital Signs











Temp  98.2 F   01/08/19 11:16


 


Pulse  87   01/08/19 11:16


 


Resp  20   01/08/19 12:13


 


BP  110/60   01/08/19 11:16


 


Pulse Ox  99   01/08/19 11:16








 Intake & Output











 01/07/19 01/08/19 01/08/19





 18:59 06:59 18:59


 


Intake Total 1520 960 360


 


Output Total 375 1595 300


 


Balance 1145 -635 60


 


Intake:   


 


  Oral 1520 960 360


 


Output:   


 


  Urine   100


 


  Plaza 375 1595 200


 


Other:   


 


  Date of Last Bowel 1/7/19  





  Movement   


 


  Estimated Stool Amount Large

## 2019-01-08 NOTE — PN
Subjective


Date of Service: 01/08/19


Interval History: 





Pt states that she is feeling better today.  She states that she only has pain 

with movement, but that it has improved from yesterday.  She feels that her 

physical therapy is progressing well.  She denies CP, ha, SOB, fever, chills, 

changes in bowel or bladder habits.  She just had her urinary catheter removed 

this morning, and has yet to void.








Objective


Active Medications: 








Acetaminophen (Tylenol Tab*)  650 mg PO Q4H PRN


Atorvastatin Calcium (Lipitor*)  20 mg PO DAILY [facility]; Protocol


Budesonide (Pulmicort Neb*)  0.5 mg INH BID [facility]


Digoxin (Lanoxin Tab*)  0.125 mg PO DAILY [facility]


Docusate Sodium (Colace Cap*)  200 mg PO DAILY [facility]


Donepezil HCl (Aricept Tab*)  10 mg PO BEDTIME [facility]


Enoxaparin Sodium (Lovenox(*))  40 mg SUBCUT DAILY [facility]


Ferrous Sulfate (Ferrous Sulfate Tab*)  325 mg PO DAILY [facility]


Formoterol Fumarate (Perforomist Neb.Soln*(Nf))  20 mcg INH BID ZONIA


Guaifenesin (Mucinex*)  600 mg PO BID [facility]ON[facility]


Lactulose (Lactulose*)  30 ml PO DAILY PRN


Levalbuterol HCl (Xopenex 0.63mg/3ml Neb*)  0.63 mg INH 1700 ZONIA


Levalbuterol HCl (Xopenex Hfa Inhaler*)  2 puff INH Q6HR PRN


Levetiracetam (Keppra Tab*)  250 mg PO TID [facility]


Levothyroxine Sodium (Synthroid Tab*)  50 mcg PO DAILY@0600 [facility]


Magnesium Hydroxide (Milk Of Magnesia Liq*)  30 ml PO BID PRN


Omeprazole (Prilosec Cap*)  20 mg PO DAILY@0730 [facility]


Ondansetron HCl (Zofran Inj*)  4 mg IV Q4H PRN


Oxycodone/Acetaminophen (Percocet 5/325 Tab*)  1 tab PO Q4H PRN


Potassium Chloride (Klor Con Er Tab*)  10 meq PO BID [facility]ON[facility]


Prednisone (Deltasone Tab*)  10 mg PO DAILY [facility]


Primidone (Mysoline Tab(*))  150 mg PO BEDTIME [facility]ON[facility]


Venlafaxine HCl (Effexor Xr Cap*)  225 mg PO DAILY [facility]








Vital Signs:











Temp Pulse Resp BP Pulse Ox


 


 97.7 F   71   18   113/57   100 


 


 01/08/19 07:35  01/08/19 08:34  01/08/19 08:09  01/08/19 07:35  01/08/19 07:35











Oxygen Devices in Use Now: Nasal Cannula


Appearance: Pt is sitting up in bed resting.  In no acute distress.


Eyes: No Scleral Icterus, PERRLA


Ears/Nose/Mouth/Throat: NL Teeth, Lips, Gums, Mucous Membranes Moist


Neck: NL Appearance and Movements; NL JVP, Trachea Midline


Respiratory: Symmetrical Chest Expansion and Respiratory Effort, - - decreased 

breath sounds


Cardiovascular: NL Sounds; No Murmurs; No JVD, RRR, No Edema


Abdominal: NL Sounds; No Tenderness; No Distention, No Hepatosplenomegaly


Extremities: No Edema, No Clubbing, Cyanosis, - - R hip bandage CDI, no 

surrounding erythema


Neurological: Alert and Oriented x 3


Result Diagrams: 


 01/08/19 04:47





 01/07/19 05:32





Assess/Plan/Problems-Billing


Assessment: 





Pt is a 78 yof with a history of HTN, CHF, and COPD, who presented to the ER s/

p fall.  Pt was found to have a R femur fracture.





- Patient Problems


(1) Femur fracture, right


Comment: 


- Dispo per Dr. Franklin - POD #3 s/p right gamma nail


- Referral in to PMRU


- WBAT


- PT/OT


- Pain management with bowel regimen   





(2) Chronic systolic heart failure


Comment: 


-Stable, tolerating PO well and does not appear dry; without LE edema; BP WNL; 

Echo- LV EF 45-50%


-Continue to hold Demadex and IVF


-Daily weights   





(3) Postoperative anemia due to acute blood loss


Comment: 


-Hgb 9.9; pt is asymptomatic


-No need to transfuse, continue to monitor   





(4) Chronic obstructive pulmonary disease


Comment: 


-Stable


-Continue mucinex, incentive spirometer


-Continue pulmicort, perforomist, Xopenex, and prednisone, as ordered at home   





(5) Coronary artery disease


Comment: 


-s/p stent placement


-Continue crestor


-Hold aspirin and plavix for surgery, restarting when cleared by Ortho Team   





(6) Afib


Comment: 


- s/p pacer


- continue Digoxin (Dig level wnls)   





(7) Hypothyroid


Comment: 


-Continue levothyroxine   





(8) GERD (gastroesophageal reflux disease)


Comment: 


-Continue Omeprazole    





(9) DVT prophylaxis


Comment: 


-Lovenox   





(10) Full code status





Status and Disposition: 





Inpatient.  Discharge to PMRU when appropriate.

## 2019-01-09 LAB
ANION GAP SERPL CALC-SCNC: 3 MMOL/L (ref 2–11)
BASOPHILS # BLD AUTO: 0.1 10^3/UL (ref 0–0.2)
BUN SERPL-MCNC: 12 MG/DL (ref 6–24)
BUN/CREAT SERPL: 23.1 (ref 8–20)
CALCIUM SERPL-MCNC: 8.2 MG/DL (ref 8.6–10.3)
CHLORIDE SERPL-SCNC: 101 MMOL/L (ref 101–111)
EOSINOPHIL # BLD AUTO: 0.4 10^3/UL (ref 0–0.6)
GLUCOSE SERPL-MCNC: 90 MG/DL (ref 70–100)
HCO3 SERPL-SCNC: 32 MMOL/L (ref 22–32)
HCT VFR BLD AUTO: 30 % (ref 35–47)
HGB BLD-MCNC: 10 G/DL (ref 12–16)
LYMPHOCYTES # BLD AUTO: 1.3 10^3/UL (ref 1–4.8)
MCH RBC QN AUTO: 32 PG (ref 27–31)
MCHC RBC AUTO-ENTMCNC: 34 G/DL (ref 31–36)
MCV RBC AUTO: 94 FL (ref 80–97)
MONOCYTES # BLD AUTO: 1.1 10^3/UL (ref 0–0.8)
NEUTROPHILS # BLD AUTO: 5.4 10^3/UL (ref 1.5–7.7)
NRBC # BLD AUTO: 0 10^3/UL
NRBC BLD QL AUTO: 0.1
PLATELET # BLD AUTO: 246 10^3/UL (ref 150–450)
POTASSIUM SERPL-SCNC: 3.9 MMOL/L (ref 3.5–5)
RBC # BLD AUTO: 3.17 10^6/UL (ref 4–5.4)
SODIUM SERPL-SCNC: 136 MMOL/L (ref 135–145)
WBC # BLD AUTO: 8.2 10^3/UL (ref 3.5–10.8)

## 2019-01-09 RX ADMIN — PREDNISONE SCH MG: 10 TABLET ORAL at 08:47

## 2019-01-09 RX ADMIN — DIGOXIN SCH MG: 125 TABLET ORAL at 08:49

## 2019-01-09 RX ADMIN — POTASSIUM CHLORIDE SCH MEQ: 750 TABLET, FILM COATED, EXTENDED RELEASE ORAL at 21:23

## 2019-01-09 RX ADMIN — FORMOTEROL FUMARATE DIHYDRATE SCH MCG: 20 SOLUTION RESPIRATORY (INHALATION) at 08:33

## 2019-01-09 RX ADMIN — OXYCODONE HYDROCHLORIDE AND ACETAMINOPHEN PRN TAB: 5; 325 TABLET ORAL at 07:31

## 2019-01-09 RX ADMIN — OXYCODONE HYDROCHLORIDE AND ACETAMINOPHEN PRN TAB: 5; 325 TABLET ORAL at 22:30

## 2019-01-09 RX ADMIN — DONEPEZIL HYDROCHLORIDE SCH MG: 5 TABLET, FILM COATED ORAL at 21:23

## 2019-01-09 RX ADMIN — ENOXAPARIN SODIUM SCH MG: 40 INJECTION SUBCUTANEOUS at 08:50

## 2019-01-09 RX ADMIN — OXYCODONE HYDROCHLORIDE AND ACETAMINOPHEN PRN TAB: 5; 325 TABLET ORAL at 17:56

## 2019-01-09 RX ADMIN — FORMOTEROL FUMARATE DIHYDRATE SCH MCG: 20 SOLUTION RESPIRATORY (INHALATION) at 20:11

## 2019-01-09 RX ADMIN — LEVETIRACETAM SCH MG: 500 TABLET, FILM COATED ORAL at 21:22

## 2019-01-09 RX ADMIN — GUAIFENESIN SCH MG: 600 TABLET, EXTENDED RELEASE ORAL at 08:45

## 2019-01-09 RX ADMIN — LEVALBUTEROL HYDROCHLORIDE SCH MG: 0.63 SOLUTION RESPIRATORY (INHALATION) at 17:35

## 2019-01-09 RX ADMIN — POTASSIUM CHLORIDE SCH MEQ: 750 TABLET, FILM COATED, EXTENDED RELEASE ORAL at 08:45

## 2019-01-09 RX ADMIN — VENLAFAXINE HYDROCHLORIDE SCH MG: 75 CAPSULE, EXTENDED RELEASE ORAL at 08:46

## 2019-01-09 RX ADMIN — ATORVASTATIN CALCIUM SCH MG: 20 TABLET, FILM COATED ORAL at 08:46

## 2019-01-09 RX ADMIN — DOCUSATE SODIUM SCH MG: 100 CAPSULE, LIQUID FILLED ORAL at 08:45

## 2019-01-09 RX ADMIN — OMEPRAZOLE SCH MG: 20 CAPSULE, DELAYED RELEASE ORAL at 07:32

## 2019-01-09 RX ADMIN — ASPIRIN SCH MG: 81 TABLET, CHEWABLE ORAL at 14:06

## 2019-01-09 RX ADMIN — FERROUS SULFATE TAB 325 MG (65 MG ELEMENTAL FE) SCH MG: 325 (65 FE) TAB at 08:45

## 2019-01-09 RX ADMIN — GUAIFENESIN SCH MG: 600 TABLET, EXTENDED RELEASE ORAL at 21:23

## 2019-01-09 RX ADMIN — LEVOTHYROXINE SODIUM SCH MCG: 50 TABLET ORAL at 05:45

## 2019-01-09 RX ADMIN — BUDESONIDE SCH MG: 0.5 SUSPENSION RESPIRATORY (INHALATION) at 08:39

## 2019-01-09 RX ADMIN — LEVETIRACETAM SCH MG: 500 TABLET, FILM COATED ORAL at 14:06

## 2019-01-09 RX ADMIN — BUDESONIDE SCH MG: 0.5 SUSPENSION RESPIRATORY (INHALATION) at 20:11

## 2019-01-09 RX ADMIN — LEVETIRACETAM SCH MG: 500 TABLET, FILM COATED ORAL at 08:47

## 2019-01-09 NOTE — PN
Progress Note





- Progress Note


Date of Service: 01/09/19


SOAP: 


Subjective:


[]Patient seen and examined at bedside. She is feeling well though does report 

some increased work of breathing today. At home she uses 2 or 3 L of oxygen, 

she is currently on 2L. Denies CP, dizziness, nause.a








Objective:


[]General: NAD, appears well


RLE: Right hip dressing changed, incisions are CDI without surrounding 

erythema. Thigh is soft. DF/PF intact, sensation intact to light touch distally

, DP2+


Calves are supple and nontender without erythema, edema or palpable cords 





Assessment:


[] s/p right gamma nail





Plan:


1) PT/OT-WBAT


2) dry sterile dressing change daily


3) Lovenox 40 mg sq qd x 30 days post op/SCD's for DVT prophylaxis


4) Ready for DC from ortho standpoint. PMRU or Rehab


5) Monitor O2 sat and symptoms, can try increasing to 3L O2 as patient uses at 

home. Nursing aware 





 Vital Signs











Temp  98.7 F   01/09/19 07:23


 


Pulse  72   01/09/19 08:49


 


Resp  20   01/09/19 11:07


 


BP  118/64   01/09/19 07:23


 


Pulse Ox  100   01/09/19 08:00








 Intake & Output











 01/08/19 01/09/19 01/09/19





 18:59 06:59 18:59


 


Intake Total 700 780 300


 


Output Total 400 910 600


 


Balance 300 -130 -300


 


Weight  137 lb 9.6 oz 


 


Intake:   


 


  Oral 700 780 300


 


Output:   


 


  Urine 200 910 600


 


  [address]  








 Laboratory Last Values











WBC  8.2 10^3/ul (3.5-10.8)   01/09/19  05:25    


 


RBC  3.17 10^6/ul (4.00-5.40)  L  01/09/19  05:25    


 


Hgb  10.0 g/dl (12.0-16.0)  L  01/09/19  05:25    


 


Hct  30 % (35-47)  L  01/09/19  05:25    


 


MCV  94 fL (80-97)   01/09/19  05:25    


 


MCH  32 pg (27-31)  H  01/09/19  05:25    


 


MCHC  34 g/dl (31-36)   01/09/19  05:25    


 


RDW  14 % (10.5-15)   01/09/19  05:25    


 


Plt Count  246 10^3/ul (150-450)   01/09/19  05:25    


 


MPV  7.2 fL (7.4-10.4)  L  01/09/19  05:25    


 


Neut % (Auto)  65.6 %  01/09/19  05:25    


 


Lymph % (Auto)  15.5 %  01/09/19  05:25    


 


Mono % (Auto)  13.8 %  01/09/19  05:25    


 


Eos % (Auto)  4.4 %  01/09/19  05:25    


 


Baso % (Auto)  0.7 %  01/09/19  05:25    


 


Absolute Neuts (auto)  5.4 10^3/ul (1.5-7.7)   01/09/19  05:25    


 


Absolute Lymphs (auto)  1.3 10^3/ul (1.0-4.8)   01/09/19  05:25    


 


Absolute Monos (auto)  1.1 10^3/ul (0-0.8)  H  01/09/19  05:25    


 


Absolute Eos (auto)  0.4 10^3/ul (0-0.6)   01/09/19  05:25    


 


Absolute Basos (auto)  0.1 10^3/ul (0-0.2)   01/09/19  05:25    


 


Absolute Nucleated RBC  0 10^3/ul  01/09/19  05:25    


 


Nucleated RBC %  0.1   01/09/19  05:25    


 


INR (Anticoag Therapy)  0.98  (0.77-1.02)   01/03/19  04:37    


 


APTT  26.0 seconds (26.0-36.3)   01/02/19  14:59    


 


Sodium  136 mmol/L (135-145)   01/09/19  05:25    


 


Potassium  3.9 mmol/L (3.5-5.0)   01/09/19  05:25    


 


Chloride  101 mmol/L (101-111)   01/09/19  05:25    


 


Carbon Dioxide  32 mmol/L (22-32)   01/09/19  05:25    


 


Anion Gap  3 mmol/L (2-11)   01/09/19  05:25    


 


BUN  12 mg/dL (6-24)   01/09/19  05:25    


 


Creatinine  0.52 mg/dL (0.51-0.95)   01/09/19  05:25    


 


Est GFR ( Amer)  137.6  (>60)   01/09/19  05:25    


 


Est GFR (Non-Af Amer)  113.8  (>60)   01/09/19  05:25    


 


BUN/Creatinine Ratio  23.1  (8-20)  H  01/09/19  05:25    


 


Glucose  90 mg/dL ()   01/09/19  05:25    


 


Lactic Acid  1.6 mmol/L (0.5-2.0)   01/02/19  14:59    


 


Calcium  8.2 mg/dL (8.6-10.3)  L  01/09/19  05:25    


 


Total Bilirubin  0.70 mg/dL (0.2-1.0)   01/02/19  14:59    


 


AST  22 U/L (13-39)   01/02/19  14:59    


 


ALT  16 U/L (7-52)   01/02/19  14:59    


 


Alkaline Phosphatase  70 U/L ()   01/02/19  14:59    


 


Troponin I  0.02 ng/mL (<0.04)   01/02/19  14:59    


 


Total Protein  5.7 g/dL (6.4-8.9)  L  01/02/19  14:59    


 


Albumin  3.2 g/dL (3.2-5.2)   01/02/19  14:59    


 


Globulin  2.5 g/dL (2-4)   01/02/19  14:59    


 


Albumin/Globulin Ratio  1.3  (1-3)   01/02/19  14:59    


 


Urine Color  Rosy   01/02/19  20:10    


 


Urine Appearance  Cloudy   01/02/19  20:10    


 


Urine pH  6.0  (5-9)   01/02/19  20:10    


 


Ur Specific Gravity  1.016  (1.010-1.030)   01/02/19  20:10    


 


Urine Protein  Negative  (Negative)   01/02/19  20:10    


 


Urine Ketones  Negative  (Negative)   01/02/19  20:10    


 


Urine Blood  2+  (Negative)  A  01/02/19  20:10    


 


Urine Nitrate  Negative  (Negative)   01/02/19  20:10    


 


Urine Bilirubin  Negative  (Negative)   01/02/19  20:10    


 


Urine Urobilinogen  Negative  (Negative)   01/02/19  20:10    


 


Ur Leukocyte Esterase  3+  (Negative)  A  01/02/19  20:10    


 


Urine WBC (Auto)  3+(>20/hpf)  (Absent)  A  01/02/19  20:10    


 


Urine RBC (Auto)  2+(6-10/hpf)  (Absent)  A  01/02/19  20:10    


 


Urine Bacteria  Absent  (Absent)   01/02/19  20:10    


 


Urine Glucose  Negative  (Negative)   01/02/19  20:10    


 


Digoxin  0.8 ng/ml (0.8-2.0)   01/02/19  14:59    


 


Influenza A (Rapid)  Negative  (Negative)   01/03/19  15:09    


 


Influenza B (Rapid)  Negative  (Negative)   01/03/19  15:09    


 


Blood Type  A Positive   01/02/19  14:59    


 


Antibody Screen  Negative   01/02/19  14:59

## 2019-01-09 NOTE — PN
Subjective


Date of Service: 01/09/19


Interval History: 





Pt states that she is doing well with PT.  Hip is still causing her pain, but 

it is managed well with medications.  Pt states that she has increased SOB 

since walking yesterday.  States that SOB is exacerbated by exertion, but that 

she requires more than her at-home dose of oxygen, which is 2 L, at rest.  She 

states that she has not used her incentive spirometry as often as she should.  

She denies ha, CP, fever/chills/sweats, abd pain, difficulty with bowel or 

bladder habits.  She mentions that her L calf has been tender intermittently 

"for year."





Objective


Active Medications: 








Acetaminophen (Tylenol Tab*)  650 mg PO Q4H PRN


Aspirin (Aspirin 81 Mg Chew Tab*)  81 mg PO DAILY [facility]


Atorvastatin Calcium (Lipitor*)  20 mg PO DAILY [facility]; Protocol


Budesonide (Pulmicort Neb*)  0.5 mg INH BID [facility]


Digoxin (Lanoxin Tab*)  0.125 mg PO DAILY [facility]


Docusate Sodium (Colace Cap*)  200 mg PO DAILY [facility]


Donepezil HCl (Aricept Tab*)  10 mg PO BEDTIME [facility]


Enoxaparin Sodium (Lovenox(*))  40 mg SUBCUT DAILY [facility]


Ferrous Sulfate (Ferrous Sulfate Tab*)  325 mg PO DAILY [facility]


Formoterol Fumarate (Perforomist Neb.Soln*(Nf))  20 mcg INH BID [facility]


Guaifenesin (Mucinex*)  600 mg PO BID [facility]


Lactulose (Lactulose*)  30 ml PO DAILY PRN


Levalbuterol HCl (Xopenex 0.63mg/3ml Neb*)  0.63 mg INH 1700 [facility]


Levalbuterol HCl (Xopenex Hfa Inhaler*)  2 puff INH Q6HR PRN


Levetiracetam (Keppra Tab*)  250 mg PO TID [facility]


Levothyroxine Sodium (Synthroid Tab*)  50 mcg PO DAILY@0600 [facility]


Magnesium Hydroxide (Milk Of Magnesia Liq*)  30 ml PO BID PRN


Omeprazole (Prilosec Cap*)  20 mg PO DAILY@0730 [facility]


Ondansetron HCl (Zofran Inj*)  4 mg IV Q4H PRN


Oxycodone/Acetaminophen (Percocet 5/325 Tab*)  1 tab PO Q4H PRN


Potassium Chloride (Klor Con Er Tab*)  10 meq PO BID [facility]


Prednisone (Deltasone Tab*)  10 mg PO DAILY [facility]


Primidone (Mysoline Tab(*))  150 mg PO BEDTIME [facility]


Venlafaxine HCl (Effexor Xr Cap*)  225 mg PO DAILY [facility]











Vital Signs:











Temp Pulse Resp BP Pulse Ox


 


 97.5 F   84   16   109/53   100 


 


 01/09/19 11:19  01/09/19 11:19  01/09/19 11:19  01/09/19 11:19  01/09/19 11:19











Oxygen Devices in Use Now: Nasal Cannula


Appearance: Pt is sitting in a chair; appears in no acute distress.


Eyes: No Scleral Icterus, PERRLA


Ears/Nose/Mouth/Throat: NL Teeth, Lips, Gums, Mucous Membranes Moist


Neck: NL Appearance and Movements; NL JVP, Trachea Midline


Respiratory: Symmetrical Chest Expansion and Respiratory Effort, - - Decreased 

breath sounds.  No use of accessory muscles.


Cardiovascular: NL Sounds; No Murmurs; No JVD, RRR, No Edema


Abdominal: NL Sounds; No Tenderness; No Distention, No Hepatosplenomegaly


Extremities: No Edema, No Clubbing, Cyanosis, - - R hip dressing CDI; without 

erythema; distal pulses 2+ b/l.  Negative Keturah's sign, negative calf 

tenderness.


Neurological: Alert and Oriented x 3, NL Sensation


Result Diagrams: 


 01/09/19 05:25





 01/09/19 05:25


Additional Lab and Data: 


 Laboratory Last Values











WBC  9.3 10^3/ul (3.5-10.8)   01/07/19  05:32    


 


RBC  3.22 10^6/ul (4.00-5.40)  L  01/07/19  05:32    


 


Hgb  10.3 g/dl (12.0-16.0)  L  01/07/19  05:32    


 


Hct  31 % (35-47)  L  01/07/19  05:32    


 


MCV  95 fL (80-97)   01/07/19  05:32    


 


MCH  32 pg (27-31)  H  01/07/19  05:32    


 


MCHC  34 g/dl (31-36)   01/07/19  05:32    


 


RDW  14 % (10.5-15)   01/07/19  05:32    


 


Plt Count  177 10^3/ul (150-450)   01/07/19  05:32    


 


MPV  8.0 fL (7.4-10.4)   01/07/19  05:32    


 


Neut % (Auto)  70.0 %  01/07/19  05:32    


 


Lymph % (Auto)  11.4 %  01/07/19  05:32    


 


Mono % (Auto)  14.3 %  01/07/19  05:32    


 


Eos % (Auto)  3.9 %  01/07/19  05:32    


 


Baso % (Auto)  0.4 %  01/07/19  05:32    


 


Absolute Neuts (auto)  6.5 10^3/ul (1.5-7.7)   01/07/19  05:32    


 


Absolute Lymphs (auto)  1.1 10^3/ul (1.0-4.8)   01/07/19  05:32    


 


Absolute Monos (auto)  1.3 10^3/ul (0-0.8)  H  01/07/19  05:32    


 


Absolute Eos (auto)  0.4 10^3/ul (0-0.6)   01/07/19  05:32    


 


Absolute Basos (auto)  0 10^3/ul (0-0.2)   01/07/19  05:32    


 


Absolute Nucleated RBC  0 10^3/ul  01/07/19  05:32    


 


Nucleated RBC %  0   01/07/19  05:32    


 


INR (Anticoag Therapy)  0.98  (0.77-1.02)   01/03/19  04:37    


 


APTT  26.0 seconds (26.0-36.3)   01/02/19  14:59    


 


Sodium  138 mmol/L (135-145)   01/07/19  05:32    


 


Potassium  5.0 mmol/L (3.5-5.0)   01/07/19  05:32    


 


Chloride  102 mmol/L (101-111)   01/07/19  05:32    


 


Carbon Dioxide  34 mmol/L (22-32)  H  01/07/19  05:32    


 


Anion Gap  2 mmol/L (2-11)   01/07/19  05:32    


 


BUN  13 mg/dL (6-24)   01/07/19  05:32    


 


Creatinine  0.55 mg/dL (0.51-0.95)   01/07/19  05:32    


 


Est GFR ( Amer)  129.0  (>60)   01/07/19  05:32    


 


Est GFR (Non-Af Amer)  106.6  (>60)   01/07/19  05:32    


 


BUN/Creatinine Ratio  23.6  (8-20)  H  01/07/19  05:32    


 


Glucose  98 mg/dL ()   01/07/19  05:32    


 


Lactic Acid  1.6 mmol/L (0.5-2.0)   01/02/19  14:59    


 


Calcium  8.2 mg/dL (8.6-10.3)  L  01/07/19  05:32    


 


Total Bilirubin  0.70 mg/dL (0.2-1.0)   01/02/19  14:59    


 


AST  22 U/L (13-39)   01/02/19  14:59    


 


ALT  16 U/L (7-52)   01/02/19  14:59    


 


Alkaline Phosphatase  70 U/L ()   01/02/19  14:59    


 


Troponin I  0.02 ng/mL (<0.04)   01/02/19  14:59    


 


Total Protein  5.7 g/dL (6.4-8.9)  L  01/02/19  14:59    


 


Albumin  3.2 g/dL (3.2-5.2)   01/02/19  14:59    


 


Globulin  2.5 g/dL (2-4)   01/02/19  14:59    


 


Albumin/Globulin Ratio  1.3  (1-3)   01/02/19  14:59    


 


Urine Color  Rosy   01/02/19  20:10    


 


Urine Appearance  Cloudy   01/02/19  20:10    


 


Urine pH  6.0  (5-9)   01/02/19  20:10    


 


Ur Specific Gravity  1.016  (1.010-1.030)   01/02/19  20:10    


 


Urine Protein  Negative  (Negative)   01/02/19  20:10    


 


Urine Ketones  Negative  (Negative)   01/02/19  20:10    


 


Urine Blood  2+  (Negative)  A  01/02/19  20:10    


 


Urine Nitrate  Negative  (Negative)   01/02/19  20:10    


 


Urine Bilirubin  Negative  (Negative)   01/02/19  20:10    


 


Urine Urobilinogen  Negative  (Negative)   01/02/19  20:10    


 


Ur Leukocyte Esterase  3+  (Negative)  A  01/02/19  20:10    


 


Urine WBC (Auto)  3+(>20/hpf)  (Absent)  A  01/02/19  20:10    


 


Urine RBC (Auto)  2+(6-10/hpf)  (Absent)  A  01/02/19  20:10    


 


Urine Bacteria  Absent  (Absent)   01/02/19  20:10    


 


Urine Glucose  Negative  (Negative)   01/02/19  20:10    


 


Digoxin  0.8 ng/ml (0.8-2.0)   01/02/19  14:59    


 


Influenza A (Rapid)  Negative  (Negative)   01/03/19  15:09    


 


Influenza B (Rapid)  Negative  (Negative)   01/03/19  15:09    


 


Blood Type  A Positive   01/02/19  14:59    


 


Antibody Screen  Negative   01/02/19  14:59    











Microbiology and Other Data: 


 Microbiology











 01/02/19 20:10 Urine Culture - Final





 Urine    Escherichia Coli


 


 01/03/19 14:53 Influenza Types A,B Antigen - Final





 Nasal    Specimen received for Influenza A/B Molecular testing














Assess/Plan/Problems-Billing


Assessment: 





Pt is a 78 yof with a history of HTN, CHF, and COPD, who presented to the ER s/

p fall.  Pt was found to have a R femur fracture.





- Patient Problems


(1) Femur fracture, right


Comment: 


- Dispo per Dr. Franklin - POD #3 s/p right gamma nail


- WBAT


- PT/OT


- Pain management with bowel regimen   





(2) Shortness of breath


Comment: 


-Pt is without LE edema, abnormal breath sounds; VS are normotensive and 

regular heart rate.  Intermittent calf pain plus SOB is concerning, despite 

normal VS


-Increase O2 NC from 2L to 3L 


-CTA, US b/l LE ordered to r/o PE


-Discussed with pt to increase use of incentive spirometry   





(3) Chronic systolic heart failure


Comment: 


-Stable, tolerating PO well and does not appear dry; without LE edema; BP WNL; 

Echo- LV EF 45-50%


-Continue to hold Demadex and IVF


-Daily weights   





(4) Postoperative anemia due to acute blood loss


Comment: 


-Hgb 10.0; pt is asymptomatic


-No need to transfuse, continue to monitor   





(5) Chronic obstructive pulmonary disease


Comment: 


-No exacerbation noted; increased SOB addressed above


-Continue mucinex, incentive spirometer


-Continue pulmicort, perforomist, Xopenex, and prednisone, as ordered at home   





(6) Coronary artery disease


Comment: 


-s/p stent placement


-Continue crestor


-Discussed with orth: restart aspirin now; restart plavix in 30days (once 

Lovenox is d/c'd)   





(7) Afib


Comment: 


- s/p pacer


- continue Digoxin (Dig level wnls)   





(8) Hypothyroid


Comment: 


-Continue levothyroxine   





(9) GERD (gastroesophageal reflux disease)


Comment: 


-Continue Omeprazole    





(10) DVT prophylaxis


Comment: 


-Lovenox   





(11) Full code status





Status and Disposition: 





Inpatient.  Discharge to SNF when appropriate.

## 2019-01-10 LAB
ANION GAP SERPL CALC-SCNC: 5 MMOL/L (ref 2–11)
BASOPHILS # BLD AUTO: 0.1 10^3/UL (ref 0–0.2)
BUN SERPL-MCNC: 16 MG/DL (ref 6–24)
BUN/CREAT SERPL: 29.6 (ref 8–20)
CALCIUM SERPL-MCNC: 8.9 MG/DL (ref 8.6–10.3)
CHLORIDE SERPL-SCNC: 103 MMOL/L (ref 101–111)
EOSINOPHIL # BLD AUTO: 0.3 10^3/UL (ref 0–0.6)
GLUCOSE SERPL-MCNC: 90 MG/DL (ref 70–100)
HCO3 SERPL-SCNC: 31 MMOL/L (ref 22–32)
HCT VFR BLD AUTO: 30 % (ref 35–47)
HGB BLD-MCNC: 10.1 G/DL (ref 12–16)
LYMPHOCYTES # BLD AUTO: 1.5 10^3/UL (ref 1–4.8)
MCH RBC QN AUTO: 31 PG (ref 27–31)
MCHC RBC AUTO-ENTMCNC: 33 G/DL (ref 31–36)
MCV RBC AUTO: 94 FL (ref 80–97)
MONOCYTES # BLD AUTO: 0.9 10^3/UL (ref 0–0.8)
NEUTROPHILS # BLD AUTO: 4.8 10^3/UL (ref 1.5–7.7)
NRBC # BLD AUTO: 0 10^3/UL
NRBC BLD QL AUTO: 0.1
PLATELET # BLD AUTO: 265 10^3/UL (ref 150–450)
POTASSIUM SERPL-SCNC: 4 MMOL/L (ref 3.5–5)
RBC # BLD AUTO: 3.22 10^6/UL (ref 4–5.4)
RBC UR QL AUTO: (no result)
SODIUM SERPL-SCNC: 139 MMOL/L (ref 135–145)
VIT C UR QL: (no result)
WBC # BLD AUTO: 7.6 10^3/UL (ref 3.5–10.8)
WBC UR QL AUTO: (no result)

## 2019-01-10 RX ADMIN — LEVETIRACETAM SCH MG: 500 TABLET, FILM COATED ORAL at 22:10

## 2019-01-10 RX ADMIN — DIGOXIN SCH MG: 125 TABLET ORAL at 09:32

## 2019-01-10 RX ADMIN — OXYCODONE HYDROCHLORIDE AND ACETAMINOPHEN PRN TAB: 5; 325 TABLET ORAL at 22:12

## 2019-01-10 RX ADMIN — PREDNISONE SCH MG: 10 TABLET ORAL at 09:31

## 2019-01-10 RX ADMIN — ASPIRIN SCH MG: 81 TABLET, CHEWABLE ORAL at 09:31

## 2019-01-10 RX ADMIN — GUAIFENESIN SCH MG: 600 TABLET, EXTENDED RELEASE ORAL at 22:11

## 2019-01-10 RX ADMIN — OXYCODONE HYDROCHLORIDE AND ACETAMINOPHEN PRN TAB: 5; 325 TABLET ORAL at 07:26

## 2019-01-10 RX ADMIN — LEVALBUTEROL HYDROCHLORIDE SCH MG: 0.63 SOLUTION RESPIRATORY (INHALATION) at 17:20

## 2019-01-10 RX ADMIN — FERROUS SULFATE TAB 325 MG (65 MG ELEMENTAL FE) SCH MG: 325 (65 FE) TAB at 09:30

## 2019-01-10 RX ADMIN — OXYCODONE HYDROCHLORIDE AND ACETAMINOPHEN PRN TAB: 5; 325 TABLET ORAL at 17:54

## 2019-01-10 RX ADMIN — FORMOTEROL FUMARATE DIHYDRATE SCH MCG: 20 SOLUTION RESPIRATORY (INHALATION) at 20:18

## 2019-01-10 RX ADMIN — DOCUSATE SODIUM SCH MG: 100 CAPSULE, LIQUID FILLED ORAL at 09:29

## 2019-01-10 RX ADMIN — VENLAFAXINE HYDROCHLORIDE SCH MG: 75 CAPSULE, EXTENDED RELEASE ORAL at 09:30

## 2019-01-10 RX ADMIN — POTASSIUM CHLORIDE SCH MEQ: 750 TABLET, FILM COATED, EXTENDED RELEASE ORAL at 09:31

## 2019-01-10 RX ADMIN — DONEPEZIL HYDROCHLORIDE SCH MG: 5 TABLET, FILM COATED ORAL at 22:12

## 2019-01-10 RX ADMIN — LEVOTHYROXINE SODIUM SCH MCG: 50 TABLET ORAL at 06:08

## 2019-01-10 RX ADMIN — LEVETIRACETAM SCH MG: 500 TABLET, FILM COATED ORAL at 13:47

## 2019-01-10 RX ADMIN — ENOXAPARIN SODIUM SCH MG: 40 INJECTION SUBCUTANEOUS at 09:33

## 2019-01-10 RX ADMIN — ATORVASTATIN CALCIUM SCH MG: 20 TABLET, FILM COATED ORAL at 09:30

## 2019-01-10 RX ADMIN — BUDESONIDE SCH MG: 0.5 SUSPENSION RESPIRATORY (INHALATION) at 20:19

## 2019-01-10 RX ADMIN — POTASSIUM CHLORIDE SCH MEQ: 750 TABLET, FILM COATED, EXTENDED RELEASE ORAL at 22:11

## 2019-01-10 RX ADMIN — GUAIFENESIN SCH MG: 600 TABLET, EXTENDED RELEASE ORAL at 09:30

## 2019-01-10 RX ADMIN — LEVETIRACETAM SCH MG: 500 TABLET, FILM COATED ORAL at 09:31

## 2019-01-10 RX ADMIN — FORMOTEROL FUMARATE DIHYDRATE SCH MCG: 20 SOLUTION RESPIRATORY (INHALATION) at 08:41

## 2019-01-10 RX ADMIN — BUDESONIDE SCH MG: 0.5 SUSPENSION RESPIRATORY (INHALATION) at 08:40

## 2019-01-10 RX ADMIN — OMEPRAZOLE SCH MG: 20 CAPSULE, DELAYED RELEASE ORAL at 07:26

## 2019-01-10 NOTE — PN
Subjective


Date of Service: 01/10/19


Interval History: 





Pt states that she is feeling okay.  She continues to have hip pain, but is 

progressing.  She continues to have shortness of breath, but it has gotten no 

worse since yesterday.  CTA was negative for PE, but showed lung nodule and R 

breast mass.  Last mammo unknown, but she is guessing it was 6-7 years ago.  

She states she does SBE "a couple times a year."  She denies CP, fever/chills, 

change in cough, abdominal pain, changes in bowel or bladder habits.  








Objective


Active Medications: 








Acetaminophen (Tylenol Tab*)  650 mg PO Q4H PRN


Aspirin (Aspirin 81 Mg Chew Tab*)  81 mg PO DAILY [facility]


Atorvastatin Calcium (Lipitor*)  20 mg PO DAILY [facility]; Protocol


Budesonide (Pulmicort Neb*)  0.5 mg INH BID [facility]


Digoxin (Lanoxin Tab*)  0.125 mg PO DAILY [facility]


Docusate Sodium (Colace Cap*)  200 mg PO DAILY [facility]


Donepezil HCl (Aricept Tab*)  10 mg PO BEDTIME [facility]


Enoxaparin Sodium (Lovenox(*))  40 mg SUBCUT DAILY [facility]


Ferrous Sulfate (Ferrous Sulfate Tab*)  325 mg PO DAILY [facility]


Formoterol Fumarate (Perforomist Neb.Soln*(Nf))  20 mcg INH BID [facility]


Guaifenesin (Mucinex*)  600 mg PO BID [facility]


Lactulose (Lactulose*)  30 ml PO DAILY PRN


Levalbuterol HCl (Xopenex 0.63mg/3ml Neb*)  0.63 mg INH 1700 [facility]ONIA


Levalbuterol HCl (Xopenex Hfa Inhaler*)  2 puff INH Q6HR PRN


Levetiracetam (Keppra Tab*)  250 mg PO TID [facility]


Levothyroxine Sodium (Synthroid Tab*)  50 mcg PO DAILY@0600 [facility]


Magnesium Hydroxide (Milk Of Magnesia Liq*)  30 ml PO BID PRN


Omeprazole (Prilosec Cap*)  20 mg PO DAILY@0730 [facility]


Ondansetron HCl (Zofran Inj*)  4 mg IV Q4H PRN


Oxycodone/Acetaminophen (Percocet 5/325 Tab*)  1 tab PO Q4H PRN


Potassium Chloride (Klor Con Er Tab*)  10 meq PO BID ZONIA


Prednisone (Deltasone Tab*)  10 mg PO DAILY ZONIA


Venlafaxine HCl (Effexor Xr Cap*)  225 mg PO DAILY ZONIA











Vital Signs:











Temp Pulse Resp BP Pulse Ox


 


 97.9 F   74   18   130/65   99 


 


 01/10/19 07:29  01/10/19 09:32  01/10/19 09:34  01/10/19 07:29  01/10/19 08:43











Oxygen Devices in Use Now: Nasal Cannula


Appearance: Pt is sitting up in bed, resting comfortably.  She is in no acute 

distress.


Eyes: No Scleral Icterus, PERRLA


Ears/Nose/Mouth/Throat: NL Teeth, Lips, Gums, Mucous Membranes Moist


Neck: NL Appearance and Movements; NL JVP, Trachea Midline


Respiratory: Symmetrical Chest Expansion and Respiratory Effort, Clear to 

Auscultation, - - B/l breast exam revealed large nontender mass in R breast; 

without skin changes or nipple discharge; no axillary nodes noted.  Iron RN, 

present during exam.


Cardiovascular: NL Sounds; No Murmurs; No JVD, RRR, No Edema


Abdominal: NL Sounds; No Tenderness; No Distention, No Hepatosplenomegaly


Extremities: No Edema, No Clubbing, Cyanosis, - - Negative bibi's, negative 

for calf tenderness, b/l dorsalis pedis pulses intact, b/l LE sensation intact


Neurological: Alert and Oriented x 3


Result Diagrams: 


 01/10/19 05:24





 01/10/19 05:24





Assess/Plan/Problems-Billing


Assessment: 





Pt is a 78 yof with a history of HTN, CHF, and COPD, who presented to the ER s/

p fall.  Pt was found to have a R femur fracture.





- Patient Problems


(1) Femur fracture, right


Comment: 


- Dispo per Dr. Franklin - POD #4 s/p right gamma nail


- WBAT


- PT/OT


- Pain management with bowel regimen   





(2) Breast mass, right


Comment: 


-R breast mass noted on CT


-Mammo done; per Dr. Ac, pt has a large solid circumscribed relatively low 

suspicion mass in UO quadrant of R breast.  Recommended that the patient follow-

up with ultrasound and biopsy.


-Upon discharge, please ensure that patient follows up with PCP to schedule US 

plus bx


-Obtain disc of mammo prior to discharge   





(3) Lung nodules


Comment: 


-RLL pulmonary nodules noted on CT scan


-Follow up with PCP regarding management   





(4) Shortness of breath


Comment: 


-CTA negative for pulmonary embolism, positive for RLL pulmonary nodules and 

right breast mass, discussed above


-Pt appears to be slightly fluid overloaded post-operatively; give 20 lasix once


-Continue O2 and medications   





(5) Chronic systolic heart failure


Comment: 


-Stable, tolerating PO well and does not appear dry; without LE edema; BP WNL; 

Echo- LV EF 45-50%


-Continue to hold Demadex and IVF


-Daily weights   





(6) Postoperative anemia due to acute blood loss


Comment: 


-Hgb 10.1; pt is asymptomatic


-No need to transfuse, continue to monitor   





(7) Chronic obstructive pulmonary disease


Comment: 


-No exacerbation noted; SOB addressed above


-Continue mucinex, incentive spirometer


-Continue pulmicort, perforomist, Xopenex, and prednisone, as ordered at home   





(8) Coronary artery disease


Comment: 


-s/p stent placement


-Continue crestor


-Discussed with orth: restart aspirin now; restart plavix in 30days (once 

Lovenox is d/c'd)   





(9) Afib


Comment: 


- s/p pacer


- continue Digoxin (Dig level wnls)   





(10) Hypothyroid


Comment: 


-Continue levothyroxine   





(11) GERD (gastroesophageal reflux disease)


Comment: 


-Continue Omeprazole    





(12) DVT prophylaxis


Comment: 


-Lovenox   





(13) Full code status





Status and Disposition: 





Inpatient.  Discharge to SNF when appropriate.

## 2019-01-10 NOTE — PN
Progress Note





- Progress Note


Date of Service: 01/10/19


SOAP: 


Subjective:


[]Patient seen and examined OOB in chair. She is feeling well, no CP, SOB, 

dizziness, nausea or calf tenderness. 








Objective:


[]General: NAD, appears well


RLE: Right hip dressing changed, incisions are CDI without surrounding 

erythema. Thigh is soft. DF/PF intact, sensation intact to light touch distally

, DP2+


Calves are supple and nontender without erythema, edema or palpable cords 





Assessment:


[] s/p right gamma nail





Plan:


1) PT/OT-WBAT


2) dry sterile dressing change daily


3) Lovenox 40 mg sq qd x 30 days post op/SCD's for DVT prophylaxis


4) Ready for DC from ortho standpoint. [facility] or [facility]


5) CTA negative for PE. Breast mass observed on scan, medicine following.


 Vital Signs











Temp  97.7 F   01/10/19 11:51


 


Pulse  76   01/10/19 11:51


 


Resp  18   01/10/19 11:51


 


BP  118/59   01/10/19 11:51


 


Pulse Ox  100   01/10/19 11:51








 Intake & Output











 01/09/19 01/10/19 01/10/19





 18:59 06:59 18:59


 


Intake Total 990 350 600


 


Output Total 1200 1375 600


 


Balance -210 -1025 0


 


Weight 137 lb 136 lb 14.4 oz 


 


Intake:   


 


  Oral 990 350 600


 


Output:   


 


  Urine 1200 1375 600


 


Other:   


 


  # Bowel Movements  0 








 Laboratory Last Values











WBC  7.6 10^3/ul (3.5-10.8)   01/10/19  05:24    


 


RBC  3.22 10^6/ul (4.00-5.40)  L  01/10/19  05:24    


 


Hgb  10.1 g/dl (12.0-16.0)  L  01/10/19  05:24    


 


Hct  30 % (35-47)  L  01/10/19  05:24    


 


MCV  94 fL (80-97)   01/10/19  05:24    


 


MCH  31 pg (27-31)   01/10/19  05:24    


 


MCHC  33 g/dl (31-36)   01/10/19  05:24    


 


RDW  14 % (10.5-15)   01/10/19  05:24    


 


Plt Count  265 10^3/ul (150-450)   01/10/19  05:24    


 


MPV  7.3 fL (7.4-10.4)  L  01/10/19  05:24    


 


Neut % (Auto)  63.4 %  01/10/19  05:24    


 


Lymph % (Auto)  19.6 %  01/10/19  05:24    


 


Mono % (Auto)  12.2 %  01/10/19  05:24    


 


Eos % (Auto)  3.9 %  01/10/19  05:24    


 


Baso % (Auto)  0.9 %  01/10/19  05:24    


 


Absolute Neuts (auto)  4.8 10^3/ul (1.5-7.7)   01/10/19  05:24    


 


Absolute Lymphs (auto)  1.5 10^3/ul (1.0-4.8)   01/10/19  05:24    


 


Absolute Monos (auto)  0.9 10^3/ul (0-0.8)  H  01/10/19  05:24    


 


Absolute Eos (auto)  0.3 10^3/ul (0-0.6)   01/10/19  05:24    


 


Absolute Basos (auto)  0.1 10^3/ul (0-0.2)   01/10/19  05:24    


 


Absolute Nucleated RBC  0 10^3/ul  01/10/19  05:24    


 


Nucleated RBC %  0.1   01/10/19  05:24    


 


INR (Anticoag Therapy)  0.98  (0.77-1.02)   01/03/19  04:37    


 


APTT  26.0 seconds (26.0-36.3)   01/02/19  14:59    


 


Sodium  139 mmol/L (135-145)   01/10/19  05:24    


 


Potassium  4.0 mmol/L (3.5-5.0)   01/10/19  05:24    


 


Chloride  103 mmol/L (101-111)   01/10/19  05:24    


 


Carbon Dioxide  31 mmol/L (22-32)   01/10/19  05:24    


 


Anion Gap  5 mmol/L (2-11)   01/10/19  05:24    


 


BUN  16 mg/dL (6-24)   01/10/19  05:24    


 


Creatinine  0.54 mg/dL (0.51-0.95)   01/10/19  05:24    


 


Est GFR ( Amer)  131.8  (>60)   01/10/19  05:24    


 


Est GFR (Non-Af Amer)  108.9  (>60)   01/10/19  05:24    


 


BUN/Creatinine Ratio  29.6  (8-20)  H  01/10/19  05:24    


 


Glucose  90 mg/dL ()   01/10/19  05:24    


 


Lactic Acid  1.6 mmol/L (0.5-2.0)   01/02/19  14:59    


 


Calcium  8.9 mg/dL (8.6-10.3)   01/10/19  05:24    


 


Total Bilirubin  0.70 mg/dL (0.2-1.0)   01/02/19  14:59    


 


AST  22 U/L (13-39)   01/02/19  14:59    


 


ALT  16 U/L (7-52)   01/02/19  14:59    


 


Alkaline Phosphatase  70 U/L ()   01/02/19  14:59    


 


Troponin I  0.02 ng/mL (<0.04)   01/02/19  14:59    


 


Total Protein  5.7 g/dL (6.4-8.9)  L  01/02/19  14:59    


 


Albumin  3.2 g/dL (3.2-5.2)   01/02/19  14:59    


 


Globulin  2.5 g/dL (2-4)   01/02/19  14:59    


 


Albumin/Globulin Ratio  1.3  (1-3)   01/02/19  14:59    


 


Urine Color  Yellow   01/10/19  12:31    


 


Urine Appearance  Cloudy   01/10/19  12:31    


 


Urine pH  7.0  (5-9)   01/10/19  12:31    


 


Ur Specific Gravity  1.015  (1.010-1.030)   01/10/19  12:31    


 


Urine Protein  Negative  (Negative)   01/10/19  12:31    


 


Urine Ketones  Negative  (Negative)   01/10/19  12:31    


 


Urine Blood  Negative  (Negative)   01/10/19  12:31    


 


Urine Nitrate  Negative  (Negative)   01/10/19  12:31    


 


Urine Bilirubin  Negative  (Negative)   01/10/19  12:31    


 


Urine Urobilinogen  Negative  (Negative)   01/10/19  12:31    


 


Ur Leukocyte Esterase  Trace  (Negative)  A  01/10/19  12:31    


 


Urine WBC (Auto)  Trace(0-5/hpf)  (Absent)   01/10/19  12:31    


 


Urine RBC (Auto)  2+(6-10/hpf)  (Absent)  A  01/10/19  12:31    


 


Ur Squamous Epith Cells  Present  (Absent)  A  01/10/19  12:31    


 


Urine Bacteria  Absent  (Absent)   01/10/19  12:31    


 


Urine Glucose  Negative  (Negative)   01/10/19  12:31    


 


Urine Ascorbic Acid  *  (Negative)  A  01/10/19  12:31    


 


Digoxin  0.8 ng/ml (0.8-2.0)   01/02/19  14:59    


 


Influenza A (Rapid)  Negative  (Negative)   01/03/19  15:09    


 


Influenza B (Rapid)  Negative  (Negative)   01/03/19  15:09    


 


Blood Type  A Positive   01/02/19  14:59    


 


Antibody Screen  Negative   01/02/19  14:59

## 2019-01-11 RX ADMIN — GUAIFENESIN SCH MG: 600 TABLET, EXTENDED RELEASE ORAL at 10:30

## 2019-01-11 RX ADMIN — ATORVASTATIN CALCIUM SCH MG: 20 TABLET, FILM COATED ORAL at 10:29

## 2019-01-11 RX ADMIN — BUDESONIDE SCH MG: 0.5 SUSPENSION RESPIRATORY (INHALATION) at 10:51

## 2019-01-11 RX ADMIN — OXYCODONE HYDROCHLORIDE AND ACETAMINOPHEN PRN TAB: 5; 325 TABLET ORAL at 10:31

## 2019-01-11 RX ADMIN — DONEPEZIL HYDROCHLORIDE SCH MG: 5 TABLET, FILM COATED ORAL at 20:25

## 2019-01-11 RX ADMIN — LEVETIRACETAM SCH MG: 500 TABLET, FILM COATED ORAL at 15:11

## 2019-01-11 RX ADMIN — PANTOPRAZOLE SODIUM SCH MG: 40 TABLET, DELAYED RELEASE ORAL at 10:30

## 2019-01-11 RX ADMIN — LEVOTHYROXINE SODIUM SCH MCG: 50 TABLET ORAL at 05:12

## 2019-01-11 RX ADMIN — OXYCODONE HYDROCHLORIDE AND ACETAMINOPHEN PRN TAB: 5; 325 TABLET ORAL at 05:12

## 2019-01-11 RX ADMIN — ASPIRIN SCH MG: 81 TABLET, CHEWABLE ORAL at 10:32

## 2019-01-11 RX ADMIN — LEVETIRACETAM SCH MG: 500 TABLET, FILM COATED ORAL at 20:22

## 2019-01-11 RX ADMIN — DIGOXIN SCH MG: 125 TABLET ORAL at 10:32

## 2019-01-11 RX ADMIN — FORMOTEROL FUMARATE DIHYDRATE SCH MCG: 20 SOLUTION RESPIRATORY (INHALATION) at 19:57

## 2019-01-11 RX ADMIN — GUAIFENESIN SCH MG: 600 TABLET, EXTENDED RELEASE ORAL at 20:25

## 2019-01-11 RX ADMIN — LEVETIRACETAM SCH MG: 500 TABLET, FILM COATED ORAL at 10:33

## 2019-01-11 RX ADMIN — VENLAFAXINE HYDROCHLORIDE SCH MG: 75 CAPSULE, EXTENDED RELEASE ORAL at 10:30

## 2019-01-11 RX ADMIN — ENOXAPARIN SODIUM SCH MG: 40 INJECTION SUBCUTANEOUS at 10:41

## 2019-01-11 RX ADMIN — FERROUS SULFATE TAB 325 MG (65 MG ELEMENTAL FE) SCH MG: 325 (65 FE) TAB at 10:29

## 2019-01-11 RX ADMIN — OXYCODONE HYDROCHLORIDE AND ACETAMINOPHEN PRN TAB: 5; 325 TABLET ORAL at 15:10

## 2019-01-11 RX ADMIN — MAGNESIUM HYDROXIDE PRN ML: 400 SUSPENSION ORAL at 15:11

## 2019-01-11 RX ADMIN — POTASSIUM CHLORIDE SCH MEQ: 750 TABLET, FILM COATED, EXTENDED RELEASE ORAL at 10:30

## 2019-01-11 RX ADMIN — PREDNISONE SCH MG: 10 TABLET ORAL at 10:34

## 2019-01-11 RX ADMIN — DOCUSATE SODIUM SCH MG: 100 CAPSULE, LIQUID FILLED ORAL at 10:30

## 2019-01-11 RX ADMIN — OXYCODONE HYDROCHLORIDE AND ACETAMINOPHEN PRN TAB: 5; 325 TABLET ORAL at 20:23

## 2019-01-11 RX ADMIN — POTASSIUM CHLORIDE SCH MEQ: 750 TABLET, FILM COATED, EXTENDED RELEASE ORAL at 20:25

## 2019-01-11 RX ADMIN — BUDESONIDE SCH MG: 0.5 SUSPENSION RESPIRATORY (INHALATION) at 19:57

## 2019-01-11 RX ADMIN — FORMOTEROL FUMARATE DIHYDRATE SCH MCG: 20 SOLUTION RESPIRATORY (INHALATION) at 10:51

## 2019-01-11 RX ADMIN — LEVALBUTEROL HYDROCHLORIDE SCH MG: 0.63 SOLUTION RESPIRATORY (INHALATION) at 16:18

## 2019-01-11 NOTE — PN
Progress Note





- Progress Note


Date of Service: 01/11/19


SOAP: 


Subjective:


[]Patient seen OOB, working on her puzzles.  Denies right hip pain, SOB, CP.   

Disappointed she was not accepted at [facility], scheduled to go to [facility] 

today. 








Objective:


[]


 Vital Signs











Temp  97.9 F   01/11/19 07:36


 


Pulse  80   01/11/19 10:32


 


Resp  18   01/11/19 10:31


 


BP  127/62   01/11/19 07:36


 


Pulse Ox  100   01/11/19 08:00








 Intake & Output











 01/10/19 01/11/19 01/11/19





 18:59 06:59 18:59


 


Intake Total 960 1110 480


 


Output Total 1400 1200 400


 


Balance -440 -90 80


 


Weight  140 lb 4.8 oz 


 


Intake:   


 


  Oral 960 1110 480


 


Output:   


 


  Urine 1400 1200 400


 


Other:   


 


  Estimated Void  Medium 


 


  # Bowel Movements  0 


 


  # Voids  2 








 Laboratory Results - last 24 hr











  01/10/19





  12:31


 


Urine Color  Yellow


 


Urine Appearance  Cloudy


 


Urine pH  7.0


 


Ur Specific Gravity  1.015


 


Urine Protein  Negative


 


Urine Ketones  Negative


 


Urine Blood  Negative


 


Urine Nitrate  Negative


 


Urine Bilirubin  Negative


 


Urine Urobilinogen  Negative


 


Ur Leukocyte Esterase  Trace A


 


Urine WBC (Auto)  Trace(0-5/hpf)


 


Urine RBC (Auto)  2+(6-10/hpf) A


 


Ur Squamous Epith Cells  Present A


 


Urine Bacteria  Absent


 


Urine Glucose  Negative


 


Urine Ascorbic Acid  * A








Right hip incisions are healing without erythema or drainage


calf remains non tender and soft


+ DF right ankle 


sensation remains intact distally








Assessment:


[]s/p short IM nail for IT fracture right hip POD #6








Plan:


[]WBAT RLE in PT


   Continue Lovenox daily for DVT prophylaxis for 1 month post operatively


   Light dressing to right hip incisions prn


   Follow up with Dr. Moreau 7-10 days in office

## 2019-01-12 LAB
ANION GAP SERPL CALC-SCNC: 4 MMOL/L (ref 2–11)
BUN SERPL-MCNC: 17 MG/DL (ref 6–24)
BUN/CREAT SERPL: 27.4 (ref 8–20)
CALCIUM SERPL-MCNC: 8.8 MG/DL (ref 8.6–10.3)
CHLORIDE SERPL-SCNC: 103 MMOL/L (ref 101–111)
GLUCOSE SERPL-MCNC: 80 MG/DL (ref 70–100)
HCO3 SERPL-SCNC: 33 MMOL/L (ref 22–32)
POTASSIUM SERPL-SCNC: 4.1 MMOL/L (ref 3.5–5)
SODIUM SERPL-SCNC: 140 MMOL/L (ref 135–145)

## 2019-01-12 RX ADMIN — LEVETIRACETAM SCH MG: 500 TABLET, FILM COATED ORAL at 21:31

## 2019-01-12 RX ADMIN — FERROUS SULFATE TAB 325 MG (65 MG ELEMENTAL FE) SCH MG: 325 (65 FE) TAB at 08:54

## 2019-01-12 RX ADMIN — POTASSIUM CHLORIDE SCH MEQ: 750 TABLET, FILM COATED, EXTENDED RELEASE ORAL at 08:54

## 2019-01-12 RX ADMIN — BUDESONIDE SCH MG: 0.5 SUSPENSION RESPIRATORY (INHALATION) at 08:41

## 2019-01-12 RX ADMIN — BUDESONIDE SCH MG: 0.5 SUSPENSION RESPIRATORY (INHALATION) at 19:55

## 2019-01-12 RX ADMIN — OXYCODONE HYDROCHLORIDE AND ACETAMINOPHEN PRN TAB: 5; 325 TABLET ORAL at 05:32

## 2019-01-12 RX ADMIN — POTASSIUM CHLORIDE SCH MEQ: 750 TABLET, FILM COATED, EXTENDED RELEASE ORAL at 21:32

## 2019-01-12 RX ADMIN — DIGOXIN SCH MG: 125 TABLET ORAL at 08:55

## 2019-01-12 RX ADMIN — PANTOPRAZOLE SODIUM SCH MG: 40 TABLET, DELAYED RELEASE ORAL at 08:54

## 2019-01-12 RX ADMIN — ASPIRIN SCH MG: 81 TABLET, CHEWABLE ORAL at 08:54

## 2019-01-12 RX ADMIN — ENOXAPARIN SODIUM SCH MG: 40 INJECTION SUBCUTANEOUS at 08:53

## 2019-01-12 RX ADMIN — FORMOTEROL FUMARATE DIHYDRATE SCH MCG: 20 SOLUTION RESPIRATORY (INHALATION) at 08:41

## 2019-01-12 RX ADMIN — LEVALBUTEROL HYDROCHLORIDE SCH MG: 0.63 SOLUTION RESPIRATORY (INHALATION) at 17:05

## 2019-01-12 RX ADMIN — LEVETIRACETAM SCH MG: 500 TABLET, FILM COATED ORAL at 08:54

## 2019-01-12 RX ADMIN — DONEPEZIL HYDROCHLORIDE SCH MG: 5 TABLET, FILM COATED ORAL at 21:28

## 2019-01-12 RX ADMIN — TORSEMIDE SCH MG: 20 TABLET ORAL at 08:53

## 2019-01-12 RX ADMIN — GUAIFENESIN SCH MG: 600 TABLET, EXTENDED RELEASE ORAL at 08:53

## 2019-01-12 RX ADMIN — OXYCODONE HYDROCHLORIDE AND ACETAMINOPHEN PRN TAB: 5; 325 TABLET ORAL at 13:24

## 2019-01-12 RX ADMIN — GUAIFENESIN SCH MG: 600 TABLET, EXTENDED RELEASE ORAL at 21:32

## 2019-01-12 RX ADMIN — FORMOTEROL FUMARATE DIHYDRATE SCH MCG: 20 SOLUTION RESPIRATORY (INHALATION) at 19:55

## 2019-01-12 RX ADMIN — LEVETIRACETAM SCH MG: 500 TABLET, FILM COATED ORAL at 13:24

## 2019-01-12 RX ADMIN — LEVOTHYROXINE SODIUM SCH MCG: 50 TABLET ORAL at 05:32

## 2019-01-12 RX ADMIN — VENLAFAXINE HYDROCHLORIDE SCH MG: 75 CAPSULE, EXTENDED RELEASE ORAL at 08:54

## 2019-01-12 RX ADMIN — DOCUSATE SODIUM SCH MG: 100 CAPSULE, LIQUID FILLED ORAL at 08:54

## 2019-01-12 RX ADMIN — ATORVASTATIN CALCIUM SCH MG: 20 TABLET, FILM COATED ORAL at 08:55

## 2019-01-12 RX ADMIN — PREDNISONE SCH MG: 10 TABLET ORAL at 08:55

## 2019-01-12 NOTE — PN
Subjective


Date of Service: 01/12/19


Interval History: 





pt feels well. Breathing at baseline. Pain today more in R knee and not hip





Objective


Active Medications: 








Acetaminophen (Tylenol Tab*)  650 mg PO Q4H PRN


   PRN Reason: FEVER/PAIN


   Last Admin: 01/04/19 21:32 Dose:  650 mg


Aspirin (Aspirin 81 Mg Chew Tab*)  81 mg PO DAILY [facility]


   Last Admin: 01/12/19 08:54 Dose:  81 mg


Atorvastatin Calcium (Lipitor*)  20 mg PO DAILY [facility]; Protocol


   Last Admin: 01/12/19 08:55 Dose:  20 mg


Budesonide (Pulmicort Neb*)  0.5 mg INH BID [facility]


   Last Admin: 01/12/19 08:41 Dose:  0.5 mg


Digoxin (Lanoxin Tab*)  0.125 mg PO DAILY [facility]


   Last Admin: 01/12/19 08:55 Dose:  0.125 mg


Docusate Sodium (Colace Cap*)  200 mg PO DAILY [facility]


   Last Admin: 01/12/19 08:54 Dose:  200 mg


Donepezil HCl (Aricept Tab*)  10 mg PO BEDTIME [facility]


   Last Admin: 01/11/19 20:25 Dose:  10 mg


Enoxaparin Sodium (Lovenox(*))  40 mg SUBCUT DAILY [facility]


   Last Admin: 01/12/19 08:53 Dose:  40 mg


Ferrous Sulfate (Ferrous Sulfate Tab*)  325 mg PO DAILY [facility]


   Last Admin: 01/12/19 08:54 Dose:  325 mg


Formoterol Fumarate (Perforomist Neb.Soln*(Nf))  20 mcg INH BID [facility]


   Last Admin: 01/12/19 08:41 Dose:  20 mcg


Guaifenesin (Mucinex*)  600 mg PO BID [facility]


   Last Admin: 01/12/19 08:53 Dose:  600 mg


Lactulose (Lactulose*)  30 ml PO DAILY PRN


   PRN Reason: CONSTIPATION


   Last Admin: 01/04/19 12:10 Dose:  30 ml


Levalbuterol HCl (Xopenex 0.63mg/3ml Neb*)  0.63 mg INH 1700 [facility]


   Last Admin: 01/11/19 16:18 Dose:  0.63 mg


Levalbuterol HCl (Xopenex Hfa Inhaler*)  2 puff INH Q6HR PRN


   PRN Reason: SHORTNESS OF BREATH


Levetiracetam (Keppra Tab*)  250 mg PO TID [facility]


   Last Admin: 01/12/19 08:54 Dose:  250 mg


Levothyroxine Sodium (Synthroid Tab*)  50 mcg PO DAILY@0600 [facility]


   Last Admin: 01/12/19 05:32 Dose:  50 mcg


Magnesium Hydroxide (Milk Of Magnesia Liq*)  30 ml PO BID PRN


   PRN Reason: CONSTIPATION


   Last Admin: 01/11/19 15:11 Dose:  30 ml


Ondansetron HCl (Zofran Inj*)  4 mg IV Q4H PRN


   PRN Reason: NAUSEA


Oxycodone/Acetaminophen (Percocet 5/325 Tab*)  1 tab PO Q4H PRN


   PRN Reason: PAIN


   Last Admin: 01/12/19 05:32 Dose:  1 tab


Pantoprazole Sodium (Protonix Tab *)  40 mg PO DAILY [facility]


   Last Admin: 01/12/19 08:54 Dose:  40 mg


Potassium Chloride (Klor Con Er Tab*)  10 meq PO BID [facility]


   Last Admin: 01/12/19 08:54 Dose:  10 meq


Prednisone (Deltasone Tab*)  10 mg PO DAILY [facility]


   Last Admin: 01/12/19 08:55 Dose:  10 mg


Torsemide (Demadex*)  10 mg PO DAILY [facility]


   Last Admin: 01/12/19 08:53 Dose:  10 mg


Venlafaxine HCl (Effexor Xr Cap*)  225 mg PO DAILY [facility]


   Last Admin: 01/12/19 08:54 Dose:  225 mg








 Vital Signs - 8 hr











  01/12/19 01/12/19 01/12/19





  03:24 05:32 08:05


 


Temperature 98.6 F  


 


Pulse Rate 77  


 


Respiratory 18 18 18





Rate   


 


Blood Pressure 128/64  





(mmHg)   


 


O2 Sat by Pulse 100  





Oximetry   














  01/12/19 01/12/19





  08:42 08:55


 


Temperature  


 


Pulse Rate 77 77


 


Respiratory 20 





Rate  


 


Blood Pressure  





(mmHg)  


 


O2 Sat by Pulse 100 





Oximetry  











Oxygen Devices in Use Now: Nasal Cannula


Appearance: 77 yo F in nAD, aAOx3


Eyes: No Scleral Icterus, PERRLA


Ears/Nose/Mouth/Throat: NL Teeth, Lips, Gums, Mucous Membranes Moist


Neck: NL Appearance and Movements; NL JVP, Trachea Midline


Respiratory: Symmetrical Chest Expansion and Respiratory Effort, Clear to 

Auscultation


Cardiovascular: NL Sounds; No Murmurs; No JVD, RRR


Abdominal: NL Sounds; No Tenderness; No Distention


Lymphatic: No Cervical Adenopathy


Extremities: No Edema, No Clubbing, Cyanosis


Skin: No Nodules or Sclerosis, - - R thigh incision stapled-no dehiscence no 

infection noted


Neurological: Alert and Oriented x 3, NL Muscle Strength and Tone


Result Diagrams: 


 01/10/19 05:24





 01/12/19 05:26


Additional Lab and Data: 


 Laboratory Last Values











WBC  9.3 10^3/ul (3.5-10.8)   01/07/19  05:32    


 


RBC  3.22 10^6/ul (4.00-5.40)  L  01/07/19  05:32    


 


Hgb  10.3 g/dl (12.0-16.0)  L  01/07/19  05:32    


 


Hct  31 % (35-47)  L  01/07/19  05:32    


 


MCV  95 fL (80-97)   01/07/19  05:32    


 


MCH  32 pg (27-31)  H  01/07/19  05:32    


 


MCHC  34 g/dl (31-36)   01/07/19  05:32    


 


RDW  14 % (10.5-15)   01/07/19  05:32    


 


Plt Count  177 10^3/ul (150-450)   01/07/19  05:32    


 


MPV  8.0 fL (7.4-10.4)   01/07/19  05:32    


 


Neut % (Auto)  70.0 %  01/07/19  05:32    


 


Lymph % (Auto)  11.4 %  01/07/19  05:32    


 


Mono % (Auto)  14.3 %  01/07/19  05:32    


 


Eos % (Auto)  3.9 %  01/07/19  05:32    


 


Baso % (Auto)  0.4 %  01/07/19  05:32    


 


Absolute Neuts (auto)  6.5 10^3/ul (1.5-7.7)   01/07/19  05:32    


 


Absolute Lymphs (auto)  1.1 10^3/ul (1.0-4.8)   01/07/19  05:32    


 


Absolute Monos (auto)  1.3 10^3/ul (0-0.8)  H  01/07/19  05:32    


 


Absolute Eos (auto)  0.4 10^3/ul (0-0.6)   01/07/19  05:32    


 


Absolute Basos (auto)  0 10^3/ul (0-0.2)   01/07/19  05:32    


 


Absolute Nucleated RBC  0 10^3/ul  01/07/19  05:32    


 


Nucleated RBC %  0   01/07/19  05:32    


 


INR (Anticoag Therapy)  0.98  (0.77-1.02)   01/03/19  04:37    


 


APTT  26.0 seconds (26.0-36.3)   01/02/19  14:59    


 


Sodium  138 mmol/L (135-145)   01/07/19  05:32    


 


Potassium  5.0 mmol/L (3.5-5.0)   01/07/19  05:32    


 


Chloride  102 mmol/L (101-111)   01/07/19  05:32    


 


Carbon Dioxide  34 mmol/L (22-32)  H  01/07/19  05:32    


 


Anion Gap  2 mmol/L (2-11)   01/07/19  05:32    


 


BUN  13 mg/dL (6-24)   01/07/19  05:32    


 


Creatinine  0.55 mg/dL (0.51-0.95)   01/07/19  05:32    


 


Est GFR ( Amer)  129.0  (>60)   01/07/19  05:32    


 


Est GFR (Non-Af Amer)  106.6  (>60)   01/07/19  05:32    


 


BUN/Creatinine Ratio  23.6  (8-20)  H  01/07/19  05:32    


 


Glucose  98 mg/dL ()   01/07/19  05:32    


 


Lactic Acid  1.6 mmol/L (0.5-2.0)   01/02/19  14:59    


 


Calcium  8.2 mg/dL (8.6-10.3)  L  01/07/19  05:32    


 


Total Bilirubin  0.70 mg/dL (0.2-1.0)   01/02/19  14:59    


 


AST  22 U/L (13-39)   01/02/19  14:59    


 


ALT  16 U/L (7-52)   01/02/19  14:59    


 


Alkaline Phosphatase  70 U/L ()   01/02/19  14:59    


 


Troponin I  0.02 ng/mL (<0.04)   01/02/19  14:59    


 


Total Protein  5.7 g/dL (6.4-8.9)  L  01/02/19  14:59    


 


Albumin  3.2 g/dL (3.2-5.2)   01/02/19  14:59    


 


Globulin  2.5 g/dL (2-4)   01/02/19  14:59    


 


Albumin/Globulin Ratio  1.3  (1-3)   01/02/19  14:59    


 


Urine Color  Rosy   01/02/19  20:10    


 


Urine Appearance  Cloudy   01/02/19  20:10    


 


Urine pH  6.0  (5-9)   01/02/19  20:10    


 


Ur Specific Gravity  1.016  (1.010-1.030)   01/02/19  20:10    


 


Urine Protein  Negative  (Negative)   01/02/19  20:10    


 


Urine Ketones  Negative  (Negative)   01/02/19  20:10    


 


Urine Blood  2+  (Negative)  A  01/02/19  20:10    


 


Urine Nitrate  Negative  (Negative)   01/02/19  20:10    


 


Urine Bilirubin  Negative  (Negative)   01/02/19  20:10    


 


Urine Urobilinogen  Negative  (Negative)   01/02/19  20:10    


 


Ur Leukocyte Esterase  3+  (Negative)  A  01/02/19  20:10    


 


Urine WBC (Auto)  3+(>20/hpf)  (Absent)  A  01/02/19  20:10    


 


Urine RBC (Auto)  2+(6-10/hpf)  (Absent)  A  01/02/19  20:10    


 


Urine Bacteria  Absent  (Absent)   01/02/19  20:10    


 


Urine Glucose  Negative  (Negative)   01/02/19  20:10    


 


Digoxin  0.8 ng/ml (0.8-2.0)   01/02/19  14:59    


 


Influenza A (Rapid)  Negative  (Negative)   01/03/19  15:09    


 


Influenza B (Rapid)  Negative  (Negative)   01/03/19  15:09    


 


Blood Type  A Positive   01/02/19  14:59    


 


Antibody Screen  Negative   01/02/19  14:59    











Microbiology and Other Data: 


 Microbiology











 01/02/19 20:10 Urine Culture - Final





 Urine    Escherichia Coli


 


 01/03/19 14:53 Influenza Types A,B Antigen - Final





 Nasal    Specimen received for Influenza A/B Molecular testing














Assess/Plan/Problems-Billing


Assessment: 





Pt is a 78 yof with a history of HTN, CHF, and COPD(on 02 at 2L), who presented 

to the ER s/p fall.  Pt was found to have a R femur fracture.





- Patient Problems


(1) Femur fracture, right


Comment: 


- s/p right gamma nail on 1/5/19 per Dr. Moreau


- WBAT


- PT/OT


- Pain management with bowel regimen   





(2) Breast mass, right


Comment: 


-R breast mass noted on CT


-Mammo done; per Dr. Ac, pt has a large solid circumscribed relatively low 

suspicion mass in UO quadrant of R breast.  Recommended that the patient follow-

up with ultrasound and biopsy.


-Upon discharge, please ensure that patient follows up with PCP to schedule US 

plus bx


-Obtain disc of mammo prior to discharge   





(3) Chronic obstructive pulmonary disease


Comment: 


-No exacerbation noted


-Continue mucinex, incentive spirometer


-Continue pulmicort, perforomist, Xopenex, and prednisone, as ordered at home   





(4) Coronary artery disease


Comment: 


-s/p stent placement


-Continue crestor


-Discussed with ortho: aspirin restarted; restart plavix in 30 days (once 

Lovenox is d/c'd)   





(5) Lung nodules


Comment: 


-RLL pulmonary nodules noted on CT scan


-Follow up with PCP for CT in 3-6 months . Pt and daughter aware   





(6) Postoperative anemia due to acute blood loss


Comment: 


-Hgb stable


-No need to transfuse, continue to monitor   





(7) Shortness of breath


Comment: -resolved after a dose of IV Lasix 1/10/18


-CTA negative for pulmonary embolism, positive for RLL pulmonary nodules and 

right breast mass, discussed above


-Continue O2 and medications   





(8) Afib


Comment: 


- s/p pacer


- continue Digoxin (Dig level wnls)   





(9) Urinary tract infection


Comment: 


-No leukocytosis, afebrile


-s/p tx with Ceftriaxone x 5 days total   





(10) Chronic systolic heart failure


Comment: 


-with mild exacerbation-required Lasix IV x 1 dose on 1/10/19, Echo- LV EF 45-50

%


-cont home Demadex


-Daily weights   





(11) DVT prophylaxis


Comment: 


-Lovenox   


Status and Disposition: 





Inpatient.  Discharge to SNF when appropriate.

## 2019-01-13 RX ADMIN — POTASSIUM CHLORIDE SCH MEQ: 750 TABLET, FILM COATED, EXTENDED RELEASE ORAL at 08:44

## 2019-01-13 RX ADMIN — DIGOXIN SCH MG: 125 TABLET ORAL at 08:44

## 2019-01-13 RX ADMIN — GUAIFENESIN SCH MG: 600 TABLET, EXTENDED RELEASE ORAL at 08:45

## 2019-01-13 RX ADMIN — LEVETIRACETAM SCH MG: 500 TABLET, FILM COATED ORAL at 08:45

## 2019-01-13 RX ADMIN — LEVETIRACETAM SCH MG: 500 TABLET, FILM COATED ORAL at 14:36

## 2019-01-13 RX ADMIN — LEVALBUTEROL HYDROCHLORIDE SCH MG: 0.63 SOLUTION RESPIRATORY (INHALATION) at 17:11

## 2019-01-13 RX ADMIN — DONEPEZIL HYDROCHLORIDE SCH MG: 5 TABLET, FILM COATED ORAL at 20:03

## 2019-01-13 RX ADMIN — PANTOPRAZOLE SODIUM SCH MG: 40 TABLET, DELAYED RELEASE ORAL at 08:44

## 2019-01-13 RX ADMIN — LEVETIRACETAM SCH MG: 500 TABLET, FILM COATED ORAL at 20:03

## 2019-01-13 RX ADMIN — ENOXAPARIN SODIUM SCH MG: 40 INJECTION SUBCUTANEOUS at 08:44

## 2019-01-13 RX ADMIN — ATORVASTATIN CALCIUM SCH MG: 20 TABLET, FILM COATED ORAL at 08:45

## 2019-01-13 RX ADMIN — TORSEMIDE SCH MG: 20 TABLET ORAL at 08:44

## 2019-01-13 RX ADMIN — LEVOTHYROXINE SODIUM SCH MCG: 50 TABLET ORAL at 05:38

## 2019-01-13 RX ADMIN — FORMOTEROL FUMARATE DIHYDRATE SCH MCG: 20 SOLUTION RESPIRATORY (INHALATION) at 20:19

## 2019-01-13 RX ADMIN — VENLAFAXINE HYDROCHLORIDE SCH MG: 75 CAPSULE, EXTENDED RELEASE ORAL at 08:45

## 2019-01-13 RX ADMIN — ASPIRIN SCH MG: 81 TABLET, CHEWABLE ORAL at 08:44

## 2019-01-13 RX ADMIN — BUDESONIDE SCH MG: 0.5 SUSPENSION RESPIRATORY (INHALATION) at 20:20

## 2019-01-13 RX ADMIN — OXYCODONE HYDROCHLORIDE AND ACETAMINOPHEN PRN TAB: 5; 325 TABLET ORAL at 20:03

## 2019-01-13 RX ADMIN — FERROUS SULFATE TAB 325 MG (65 MG ELEMENTAL FE) SCH MG: 325 (65 FE) TAB at 08:44

## 2019-01-13 RX ADMIN — BUDESONIDE SCH MG: 0.5 SUSPENSION RESPIRATORY (INHALATION) at 09:46

## 2019-01-13 RX ADMIN — FORMOTEROL FUMARATE DIHYDRATE SCH MCG: 20 SOLUTION RESPIRATORY (INHALATION) at 09:46

## 2019-01-13 RX ADMIN — GUAIFENESIN SCH MG: 600 TABLET, EXTENDED RELEASE ORAL at 20:03

## 2019-01-13 RX ADMIN — DOCUSATE SODIUM SCH MG: 100 CAPSULE, LIQUID FILLED ORAL at 08:45

## 2019-01-13 RX ADMIN — OXYCODONE HYDROCHLORIDE AND ACETAMINOPHEN PRN TAB: 5; 325 TABLET ORAL at 07:47

## 2019-01-13 RX ADMIN — POTASSIUM CHLORIDE SCH MEQ: 750 TABLET, FILM COATED, EXTENDED RELEASE ORAL at 20:03

## 2019-01-13 RX ADMIN — PREDNISONE SCH MG: 10 TABLET ORAL at 08:45

## 2019-01-13 NOTE — PN
Subjective


Date of Service: 01/13/19


Interval History: 





pt's post op leg is still "sore". Today she was still SOB when walking to the 

bathroom, more than usually at home prior to the surgery





Objective


Active Medications: 








Acetaminophen (Tylenol Tab*)  650 mg PO Q4H PRN


   PRN Reason: FEVER/PAIN


   Last Admin: 01/04/19 21:32 Dose:  650 mg


Aspirin (Aspirin 81 Mg Chew Tab*)  81 mg PO DAILY [facility]


   Last Admin: 01/12/19 08:54 Dose:  81 mg


Atorvastatin Calcium (Lipitor*)  20 mg PO DAILY [facility]; Protocol


   Last Admin: 01/12/19 08:55 Dose:  20 mg


Budesonide (Pulmicort Neb*)  0.5 mg INH BID [facility]


   Last Admin: 01/12/19 19:55 Dose:  0.5 mg


Digoxin (Lanoxin Tab*)  0.125 mg PO DAILY [facility]


   Last Admin: 01/12/19 08:55 Dose:  0.125 mg


Docusate Sodium (Colace Cap*)  200 mg PO DAILY [facility]


   Last Admin: 01/12/19 08:54 Dose:  200 mg


Donepezil HCl (Aricept Tab*)  10 mg PO BEDTIME [facility]


   Last Admin: 01/12/19 21:28 Dose:  10 mg


Enoxaparin Sodium (Lovenox(*))  40 mg SUBCUT DAILY [facility]


   Last Admin: 01/12/19 08:53 Dose:  40 mg


Ferrous Sulfate (Ferrous Sulfate Tab*)  325 mg PO DAILY [facility]


   Last Admin: 01/12/19 08:54 Dose:  325 mg


Formoterol Fumarate (Perforomist Neb.Soln*(Nf))  20 mcg INH BID [facility]


   Last Admin: 01/12/19 19:55 Dose:  20 mcg


Furosemide (Lasix Iv*)  20 mg IV ONCE ONE


   Stop: 01/13/19 08:42


Guaifenesin (Mucinex*)  600 mg PO BID [facility]


   Last Admin: 01/12/19 21:32 Dose:  600 mg


Lactulose (Lactulose*)  30 ml PO DAILY PRN


   PRN Reason: CONSTIPATION


   Last Admin: 01/04/19 12:10 Dose:  30 ml


Levalbuterol HCl (Xopenex 0.63mg/3ml Neb*)  0.63 mg INH 1700 [facility]


   Last Admin: 01/12/19 17:05 Dose:  0.63 mg


Levalbuterol HCl (Xopenex Hfa Inhaler*)  2 puff INH Q6HR PRN


   PRN Reason: SHORTNESS OF BREATH


Levetiracetam (Keppra Tab*)  250 mg PO TID [facility]


   Last Admin: 01/12/19 21:31 Dose:  250 mg


Levothyroxine Sodium (Synthroid Tab*)  50 mcg PO DAILY@0600 [facility]


   Last Admin: 01/13/19 05:38 Dose:  50 mcg


Magnesium Hydroxide (Milk Of Magnesia Liq*)  30 ml PO BID PRN


   PRN Reason: CONSTIPATION


   Last Admin: 01/11/19 15:11 Dose:  30 ml


Ondansetron HCl (Zofran Inj*)  4 mg IV Q4H PRN


   PRN Reason: NAUSEA


Oxycodone/Acetaminophen (Percocet 5/325 Tab*)  1 tab PO Q4H PRN


   PRN Reason: PAIN


   Last Admin: 01/13/19 07:47 Dose:  1 tab


Pantoprazole Sodium (Protonix Tab *)  40 mg PO DAILY [facility]


   Last Admin: 01/12/19 08:54 Dose:  40 mg


Potassium Chloride (Klor Con Er Tab*)  10 meq PO BID [facility]


   Last Admin: 01/12/19 21:32 Dose:  10 meq


Prednisone (Deltasone Tab*)  10 mg PO DAILY [facility]


   Last Admin: 01/12/19 08:55 Dose:  10 mg


Torsemide (Demadex*)  10 mg PO DAILY [facility]


   Last Admin: 01/12/19 08:53 Dose:  10 mg


Venlafaxine HCl (Effexor Xr Cap*)  225 mg PO DAILY [facility]


   Last Admin: 01/12/19 08:54 Dose:  225 mg








 Vital Signs - 8 hr











  01/13/19 01/13/19 01/13/19





  00:49 03:15 07:26


 


Temperature 97.7 F 98.3 F 97.9 F


 


Pulse Rate 85 77 74


 


Respiratory 20 20 18





Rate   


 


Blood Pressure 130/67 126/63 126/59





(mmHg)   


 


O2 Sat by Pulse 100 100 100





Oximetry   














  01/13/19 01/13/19





  07:47 08:00


 


Temperature  


 


Pulse Rate  


 


Respiratory 20 20





Rate  


 


Blood Pressure  





(mmHg)  


 


O2 Sat by Pulse  





Oximetry  











Oxygen Devices in Use Now: None


Appearance: 79 yo F in NAD, AAOx3


Eyes: No Scleral Icterus, PERRLA


Ears/Nose/Mouth/Throat: NL Teeth, Lips, Gums, Mucous Membranes Moist


Neck: NL Appearance and Movements; NL JVP, Trachea Midline


Respiratory: Symmetrical Chest Expansion and Respiratory Effort, - - crackles 

at b/l bases


Abdominal: NL Sounds; No Tenderness; No Distention, No Hepatosplenomegaly


Lymphatic: No Axillary Adenopathy


Extremities: No Edema, No Clubbing, Cyanosis


Skin: No Rash or Ulcers, No Nodules or Sclerosis, - - R hip incition stapled-no 

dehiscence or other abn noted


Neurological: Alert and Oriented x 3, NL Muscle Strength and Tone


Result Diagrams: 


 01/10/19 05:24





 01/12/19 05:26


Additional Lab and Data: 


 Laboratory Last Values











WBC  9.3 10^3/ul (3.5-10.8)   01/07/19  05:32    


 


RBC  3.22 10^6/ul (4.00-5.40)  L  01/07/19  05:32    


 


Hgb  10.3 g/dl (12.0-16.0)  L  01/07/19  05:32    


 


Hct  31 % (35-47)  L  01/07/19  05:32    


 


MCV  95 fL (80-97)   01/07/19  05:32    


 


MCH  32 pg (27-31)  H  01/07/19  05:32    


 


MCHC  34 g/dl (31-36)   01/07/19  05:32    


 


RDW  14 % (10.5-15)   01/07/19  05:32    


 


Plt Count  177 10^3/ul (150-450)   01/07/19  05:32    


 


MPV  8.0 fL (7.4-10.4)   01/07/19  05:32    


 


Neut % (Auto)  70.0 %  01/07/19  05:32    


 


Lymph % (Auto)  11.4 %  01/07/19  05:32    


 


Mono % (Auto)  14.3 %  01/07/19  05:32    


 


Eos % (Auto)  3.9 %  01/07/19  05:32    


 


Baso % (Auto)  0.4 %  01/07/19  05:32    


 


Absolute Neuts (auto)  6.5 10^3/ul (1.5-7.7)   01/07/19  05:32    


 


Absolute Lymphs (auto)  1.1 10^3/ul (1.0-4.8)   01/07/19  05:32    


 


Absolute Monos (auto)  1.3 10^3/ul (0-0.8)  H  01/07/19  05:32    


 


Absolute Eos (auto)  0.4 10^3/ul (0-0.6)   01/07/19  05:32    


 


Absolute Basos (auto)  0 10^3/ul (0-0.2)   01/07/19  05:32    


 


Absolute Nucleated RBC  0 10^3/ul  01/07/19  05:32    


 


Nucleated RBC %  0   01/07/19  05:32    


 


INR (Anticoag Therapy)  0.98  (0.77-1.02)   01/03/19  04:37    


 


APTT  26.0 seconds (26.0-36.3)   01/02/19  14:59    


 


Sodium  138 mmol/L (135-145)   01/07/19  05:32    


 


Potassium  5.0 mmol/L (3.5-5.0)   01/07/19  05:32    


 


Chloride  102 mmol/L (101-111)   01/07/19  05:32    


 


Carbon Dioxide  34 mmol/L (22-32)  H  01/07/19  05:32    


 


Anion Gap  2 mmol/L (2-11)   01/07/19  05:32    


 


BUN  13 mg/dL (6-24)   01/07/19  05:32    


 


Creatinine  0.55 mg/dL (0.51-0.95)   01/07/19  05:32    


 


Est GFR ( Amer)  129.0  (>60)   01/07/19  05:32    


 


Est GFR (Non-Af Amer)  106.6  (>60)   01/07/19  05:32    


 


BUN/Creatinine Ratio  23.6  (8-20)  H  01/07/19  05:32    


 


Glucose  98 mg/dL ()   01/07/19  05:32    


 


Lactic Acid  1.6 mmol/L (0.5-2.0)   01/02/19  14:59    


 


Calcium  8.2 mg/dL (8.6-10.3)  L  01/07/19  05:32    


 


Total Bilirubin  0.70 mg/dL (0.2-1.0)   01/02/19  14:59    


 


AST  22 U/L (13-39)   01/02/19  14:59    


 


ALT  16 U/L (7-52)   01/02/19  14:59    


 


Alkaline Phosphatase  70 U/L ()   01/02/19  14:59    


 


Troponin I  0.02 ng/mL (<0.04)   01/02/19  14:59    


 


Total Protein  5.7 g/dL (6.4-8.9)  L  01/02/19  14:59    


 


Albumin  3.2 g/dL (3.2-5.2)   01/02/19  14:59    


 


Globulin  2.5 g/dL (2-4)   01/02/19  14:59    


 


Albumin/Globulin Ratio  1.3  (1-3)   01/02/19  14:59    


 


Urine Color  Rosy   01/02/19  20:10    


 


Urine Appearance  Cloudy   01/02/19  20:10    


 


Urine pH  6.0  (5-9)   01/02/19  20:10    


 


Ur Specific Gravity  1.016  (1.010-1.030)   01/02/19  20:10    


 


Urine Protein  Negative  (Negative)   01/02/19  20:10    


 


Urine Ketones  Negative  (Negative)   01/02/19  20:10    


 


Urine Blood  2+  (Negative)  A  01/02/19  20:10    


 


Urine Nitrate  Negative  (Negative)   01/02/19  20:10    


 


Urine Bilirubin  Negative  (Negative)   01/02/19  20:10    


 


Urine Urobilinogen  Negative  (Negative)   01/02/19  20:10    


 


Ur Leukocyte Esterase  3+  (Negative)  A  01/02/19  20:10    


 


Urine WBC (Auto)  3+(>20/hpf)  (Absent)  A  01/02/19  20:10    


 


Urine RBC (Auto)  2+(6-10/hpf)  (Absent)  A  01/02/19  20:10    


 


Urine Bacteria  Absent  (Absent)   01/02/19  20:10    


 


Urine Glucose  Negative  (Negative)   01/02/19  20:10    


 


Digoxin  0.8 ng/ml (0.8-2.0)   01/02/19  14:59    


 


Influenza A (Rapid)  Negative  (Negative)   01/03/19  15:09    


 


Influenza B (Rapid)  Negative  (Negative)   01/03/19  15:09    


 


Blood Type  A Positive   01/02/19  14:59    


 


Antibody Screen  Negative   01/02/19  14:59    











Microbiology and Other Data: 


 Microbiology











 01/02/19 20:10 Urine Culture - Final





 Urine    Escherichia Coli


 


 01/03/19 14:53 Influenza Types A,B Antigen - Final





 Nasal    Specimen received for Influenza A/B Molecular testing














Assess/Plan/Problems-Billing


Assessment: 





Pt is a 78 yof with a history of HTN, CHF, and COPD(on 02 at 2L), who presented 

to the ER s/p fall.  Pt was found to have a R femur fracture.





- Patient Problems


(1) Femur fracture, right


Comment: 


- s/p right gamma nail on 1/5/19 per Dr. Moreau


- WBAT


- PT/OT


- Pain management with bowel regimen   





(2) Breast mass, right


Comment: 


-R breast mass noted on CT


-Mammo done; per Dr. Ac, pt has a large solid circumscribed relatively low 

suspicion mass in UO quadrant of R breast.  Recommended that the patient follow-

up with ultrasound and biopsy.


-Upon discharge, please ensure that patient follows up with PCP to schedule US 

plus bx


-Obtain disc of mammo prior to discharge   





(3) Chronic obstructive pulmonary disease


Comment: 


-No exacerbation noted


-Continue mucinex, incentive spirometer


-Continue pulmicort, perforomist, Xopenex, and prednisone, as ordered at home   





(4) Coronary artery disease


Comment: 


-s/p stent placement


-Continue crestor


-Discussed with ortho: aspirin restarted; restart plavix in 30 days (once 

Lovenox is d/c'd)   





(5) Lung nodules


Comment: 


-RLL pulmonary nodules noted on CT scan


-Follow up with PCP for CT in 3-6 months . Pt and daughter aware   





(6) Postoperative anemia due to acute blood loss


Comment: 


-Hgb stable


-No need to transfuse, continue to monitor   





(7) Shortness of breath


Comment: -resolved after a dose of IV Lasix 1/10/18, but today pt has MUSTAFA. will 

tx with Lasix 20 mg IV today.


-CTA negative for pulmonary embolism, positive for RLL pulmonary nodules and 

right breast mass, discussed above


-Continue O2 and medications   





(8) Afib


Comment: 


- s/p pacer


- continue Digoxin (Dig level wnls)   





(9) Urinary tract infection


Comment: 


-No leukocytosis, afebrile


-s/p tx with Ceftriaxone x 5 days total   





(10) Chronic systolic heart failure


Comment: 


-with mild exacerbation-required Lasix IV x 1 dose on 1/10/19, Echo- LV EF 45-50

%. will tx with Lasix today


-cont home Demadex


-Daily weights   





(11) DVT prophylaxis


Comment: 


-Lovenox   


Status and Disposition: 





Inpatient.  Discharge to SNF when appropriate.

## 2019-01-14 LAB
ANION GAP SERPL CALC-SCNC: 5 MMOL/L (ref 2–11)
BASOPHILS # BLD AUTO: 0.1 10^3/UL (ref 0–0.2)
BUN SERPL-MCNC: 16 MG/DL (ref 6–24)
BUN/CREAT SERPL: 22.5 (ref 8–20)
CALCIUM SERPL-MCNC: 9.2 MG/DL (ref 8.6–10.3)
CHLORIDE SERPL-SCNC: 100 MMOL/L (ref 101–111)
EOSINOPHIL # BLD AUTO: 0.2 10^3/UL (ref 0–0.6)
GLUCOSE SERPL-MCNC: 83 MG/DL (ref 70–100)
HCO3 SERPL-SCNC: 35 MMOL/L (ref 22–32)
HCT VFR BLD AUTO: 32 % (ref 35–47)
HGB BLD-MCNC: 10.6 G/DL (ref 12–16)
LYMPHOCYTES # BLD AUTO: 1.6 10^3/UL (ref 1–4.8)
MCH RBC QN AUTO: 32 PG (ref 27–31)
MCHC RBC AUTO-ENTMCNC: 33 G/DL (ref 31–36)
MCV RBC AUTO: 95 FL (ref 80–97)
MONOCYTES # BLD AUTO: 1 10^3/UL (ref 0–0.8)
NEUTROPHILS # BLD AUTO: 4.8 10^3/UL (ref 1.5–7.7)
NRBC # BLD AUTO: 0 10^3/UL
NRBC BLD QL AUTO: 0.1
PLATELET # BLD AUTO: 339 10^3/UL (ref 150–450)
POTASSIUM SERPL-SCNC: 4 MMOL/L (ref 3.5–5)
RBC # BLD AUTO: 3.37 10^6/UL (ref 4–5.4)
SODIUM SERPL-SCNC: 140 MMOL/L (ref 135–145)
WBC # BLD AUTO: 7.6 10^3/UL (ref 3.5–10.8)

## 2019-01-14 RX ADMIN — ENOXAPARIN SODIUM SCH MG: 40 INJECTION SUBCUTANEOUS at 08:36

## 2019-01-14 RX ADMIN — LEVALBUTEROL HYDROCHLORIDE SCH MG: 0.63 SOLUTION RESPIRATORY (INHALATION) at 17:54

## 2019-01-14 RX ADMIN — LEVETIRACETAM SCH MG: 500 TABLET, FILM COATED ORAL at 21:22

## 2019-01-14 RX ADMIN — POTASSIUM CHLORIDE SCH MEQ: 750 TABLET, FILM COATED, EXTENDED RELEASE ORAL at 21:23

## 2019-01-14 RX ADMIN — LEVOTHYROXINE SODIUM SCH MCG: 50 TABLET ORAL at 05:35

## 2019-01-14 RX ADMIN — FORMOTEROL FUMARATE DIHYDRATE SCH MCG: 20 SOLUTION RESPIRATORY (INHALATION) at 21:41

## 2019-01-14 RX ADMIN — OXYCODONE HYDROCHLORIDE AND ACETAMINOPHEN PRN TAB: 5; 325 TABLET ORAL at 04:48

## 2019-01-14 RX ADMIN — GUAIFENESIN SCH MG: 600 TABLET, EXTENDED RELEASE ORAL at 21:23

## 2019-01-14 RX ADMIN — FERROUS SULFATE TAB 325 MG (65 MG ELEMENTAL FE) SCH MG: 325 (65 FE) TAB at 08:34

## 2019-01-14 RX ADMIN — VENLAFAXINE HYDROCHLORIDE SCH MG: 75 CAPSULE, EXTENDED RELEASE ORAL at 08:33

## 2019-01-14 RX ADMIN — DONEPEZIL HYDROCHLORIDE SCH MG: 5 TABLET, FILM COATED ORAL at 21:22

## 2019-01-14 RX ADMIN — ATORVASTATIN CALCIUM SCH MG: 20 TABLET, FILM COATED ORAL at 08:32

## 2019-01-14 RX ADMIN — LEVETIRACETAM SCH MG: 500 TABLET, FILM COATED ORAL at 08:34

## 2019-01-14 RX ADMIN — DIGOXIN SCH MG: 125 TABLET ORAL at 08:35

## 2019-01-14 RX ADMIN — PREDNISONE SCH MG: 10 TABLET ORAL at 08:34

## 2019-01-14 RX ADMIN — BUDESONIDE SCH MG: 0.5 SUSPENSION RESPIRATORY (INHALATION) at 21:41

## 2019-01-14 RX ADMIN — OXYCODONE HYDROCHLORIDE AND ACETAMINOPHEN PRN TAB: 5; 325 TABLET ORAL at 23:17

## 2019-01-14 RX ADMIN — FORMOTEROL FUMARATE DIHYDRATE SCH: 20 SOLUTION RESPIRATORY (INHALATION) at 09:42

## 2019-01-14 RX ADMIN — DOCUSATE SODIUM SCH MG: 100 CAPSULE, LIQUID FILLED ORAL at 08:36

## 2019-01-14 RX ADMIN — ASPIRIN SCH MG: 81 TABLET, CHEWABLE ORAL at 08:32

## 2019-01-14 RX ADMIN — POTASSIUM CHLORIDE SCH MEQ: 750 TABLET, FILM COATED, EXTENDED RELEASE ORAL at 08:32

## 2019-01-14 RX ADMIN — PANTOPRAZOLE SODIUM SCH MG: 40 TABLET, DELAYED RELEASE ORAL at 08:33

## 2019-01-14 RX ADMIN — GUAIFENESIN SCH MG: 600 TABLET, EXTENDED RELEASE ORAL at 08:36

## 2019-01-14 RX ADMIN — LEVETIRACETAM SCH MG: 500 TABLET, FILM COATED ORAL at 13:57

## 2019-01-14 RX ADMIN — TORSEMIDE SCH MG: 20 TABLET ORAL at 08:32

## 2019-01-14 RX ADMIN — BUDESONIDE SCH: 0.5 SUSPENSION RESPIRATORY (INHALATION) at 09:42

## 2019-01-14 NOTE — PN
Progress Note





- Progress Note


Date of Service: 01/14/19


SOAP: 


Subjective:


[]Patient was seen and examined at bedside. She feels well with well controlled 

right hip pain. Denies CP, SOB, dizziness, nausea. 








Objective:


[]General: Well appearing, NAD 


RLE: Right hip incisions are healing well without erythema or drainage. DF/PF 

intact, DP2+, sensation intact to light touch distally 


BL calves supple and nontender without erythema, edema or palpable cords 








Assessment:


[]s/p short IM nail for IT fracture right hip 








Plan:


[]WBAT RLE in PT


   Continue Lovenox daily for DVT prophylaxis for 1 month post operatively


   Light dressing to right hip incisions 


   Follow up with Dr. Moreau in 1 week 





 Vital Signs











Temp  98.2 F   01/14/19 11:20


 


Pulse  83   01/14/19 11:20


 


Resp  16   01/14/19 11:20


 


BP  137/56   01/14/19 11:20


 


Pulse Ox  99   01/14/19 11:20








 Intake & Output











 01/13/19 01/14/19 01/14/19





 18:59 06:59 18:59


 


Intake Total 920 480 900


 


Output Total 3768 630 8165


 


Balance -780 -370 -400


 


Weight  134 lb 4.8 oz 


 


Intake:   


 


  Oral 920 480 900


 


Output:   


 


  Urine [id number]


 


Other:   


 


  # Bowel Movements 1  


 


  Estimated Stool Amount Medium  








 Laboratory Last Values











WBC  7.6 10^3/ul (3.5-10.8)   01/14/19  04:35    


 


RBC  3.37 10^6/ul (4.00-5.40)  L  01/14/19  04:35    


 


Hgb  10.6 g/dl (12.0-16.0)  L  01/14/19  04:35    


 


Hct  32 % (35-47)  L  01/14/19  04:35    


 


MCV  95 fL (80-97)   01/14/19  04:35    


 


MCH  32 pg (27-31)  H  01/14/19  04:35    


 


MCHC  33 g/dl (31-36)   01/14/19  04:35    


 


RDW  15 % (10.5-15)   01/14/19  04:35    


 


Plt Count  339 10^3/ul (150-450)   01/14/19  04:35    


 


MPV  6.7 fL (7.4-10.4)  L  01/14/19  04:35    


 


Neut % (Auto)  62.3 %  01/14/19  04:35    


 


Lymph % (Auto)  20.5 %  01/14/19  04:35    


 


Mono % (Auto)  13.6 %  01/14/19  04:35    


 


Eos % (Auto)  2.5 %  01/14/19  04:35    


 


Baso % (Auto)  1.1 %  01/14/19  04:35    


 


Absolute Neuts (auto)  4.8 10^3/ul (1.5-7.7)   01/14/19  04:35    


 


Absolute Lymphs (auto)  1.6 10^3/ul (1.0-4.8)   01/14/19  04:35    


 


Absolute Monos (auto)  1.0 10^3/ul (0-0.8)  H  01/14/19  04:35    


 


Absolute Eos (auto)  0.2 10^3/ul (0-0.6)   01/14/19  04:35    


 


Absolute Basos (auto)  0.1 10^3/ul (0-0.2)   01/14/19  04:35    


 


Absolute Nucleated RBC  0 10^3/ul  01/14/19  04:35    


 


Nucleated RBC %  0.1   01/14/19  04:35    


 


INR (Anticoag Therapy)  0.98  (0.77-1.02)   01/03/19  04:37    


 


APTT  26.0 seconds (26.0-36.3)   01/02/19  14:59    


 


Sodium  140 mmol/L (135-145)   01/14/19  04:35    


 


Potassium  4.0 mmol/L (3.5-5.0)   01/14/19  04:35    


 


Chloride  100 mmol/L (101-111)  L  01/14/19  04:35    


 


Carbon Dioxide  35 mmol/L (22-32)  H  01/14/19  04:35    


 


Anion Gap  5 mmol/L (2-11)   01/14/19  04:35    


 


BUN  16 mg/dL (6-24)   01/14/19  04:35    


 


Creatinine  0.71 mg/dL (0.51-0.95)   01/14/19  04:35    


 


Est GFR ( Amer)  96.1  (>60)   01/14/19  04:35    


 


Est GFR (Non-Af Amer)  79.4  (>60)   01/14/19  04:35    


 


BUN/Creatinine Ratio  22.5  (8-20)  H  01/14/19  04:35    


 


Glucose  83 mg/dL ()   01/14/19  04:35    


 


Lactic Acid  1.6 mmol/L (0.5-2.0)   01/02/19  14:59    


 


Calcium  9.2 mg/dL (8.6-10.3)   01/14/19  04:35    


 


Total Bilirubin  0.70 mg/dL (0.2-1.0)   01/02/19  14:59    


 


AST  22 U/L (13-39)   01/02/19  14:59    


 


ALT  16 U/L (7-52)   01/02/19  14:59    


 


Alkaline Phosphatase  70 U/L ()   01/02/19  14:59    


 


Troponin I  0.02 ng/mL (<0.04)   01/02/19  14:59    


 


Total Protein  5.7 g/dL (6.4-8.9)  L  01/02/19  14:59    


 


Albumin  3.2 g/dL (3.2-5.2)   01/02/19  14:59    


 


Globulin  2.5 g/dL (2-4)   01/02/19  14:59    


 


Albumin/Globulin Ratio  1.3  (1-3)   01/02/19  14:59    


 


Urine Color  Yellow   01/10/19  12:31    


 


Urine Appearance  Cloudy   01/10/19  12:31    


 


Urine pH  7.0  (5-9)   01/10/19  12:31    


 


Ur Specific Gravity  1.015  (1.010-1.030)   01/10/19  12:31    


 


Urine Protein  Negative  (Negative)   01/10/19  12:31    


 


Urine Ketones  Negative  (Negative)   01/10/19  12:31    


 


Urine Blood  Negative  (Negative)   01/10/19  12:31    


 


Urine Nitrate  Negative  (Negative)   01/10/19  12:31    


 


Urine Bilirubin  Negative  (Negative)   01/10/19  12:31    


 


Urine Urobilinogen  Negative  (Negative)   01/10/19  12:31    


 


Ur Leukocyte Esterase  Trace  (Negative)  A  01/10/19  12:31    


 


Urine WBC (Auto)  Trace(0-5/hpf)  (Absent)   01/10/19  12:31    


 


Urine RBC (Auto)  2+(6-10/hpf)  (Absent)  A  01/10/19  12:31    


 


Ur Squamous Epith Cells  Present  (Absent)  A  01/10/19  12:31    


 


Urine Bacteria  Absent  (Absent)   01/10/19  12:31    


 


Urine Glucose  Negative  (Negative)   01/10/19  12:31    


 


Urine Ascorbic Acid  *  (Negative)  A  01/10/19  12:31    


 


Digoxin  0.8 ng/ml (0.8-2.0)   01/02/19  14:59    


 


Influenza A (Rapid)  Negative  (Negative)   01/03/19  15:09    


 


Influenza B (Rapid)  Negative  (Negative)   01/03/19  15:09    


 


Blood Type  A Positive   01/02/19  14:59    


 


Antibody Screen  Negative   01/02/19  14:59

## 2019-01-14 NOTE — PN
Subjective


Date of Service: 01/14/19


Interval History: 





Patient seen and examined. States she is feeling well, participating with PT, 

tolerating meals, pain well controlled. Patient expresses wish to go home with 

her daughter, however, other daughter is present and concerned that patient 

would do better in STR then to home.





Objective


Active Medications: 








Acetaminophen (Tylenol Tab*)  650 mg PO Q4H PRN


   PRN Reason: FEVER/PAIN


   Last Admin: 01/04/19 21:32 Dose:  650 mg


Aspirin (Aspirin 81 Mg Chew Tab*)  81 mg PO DAILY [facility]


   Last Admin: 01/14/19 08:32 Dose:  81 mg


Atorvastatin Calcium (Lipitor*)  20 mg PO DAILY [facility]; Protocol


   Last Admin: 01/14/19 08:32 Dose:  20 mg


Budesonide (Pulmicort Neb*)  0.5 mg INH BID [facility]


   Last Admin: 01/14/19 09:42 Dose:  Not Given


Digoxin (Lanoxin Tab*)  0.125 mg PO DAILY [facility]


   Last Admin: 01/14/19 08:35 Dose:  0.125 mg


Docusate Sodium (Colace Cap*)  200 mg PO DAILY [facility]


   Last Admin: 01/14/19 08:36 Dose:  200 mg


Donepezil HCl (Aricept Tab*)  10 mg PO BEDTIME [facility]


   Last Admin: 01/13/19 20:03 Dose:  10 mg


Enoxaparin Sodium (Lovenox(*))  40 mg SUBCUT DAILY [facility]


   Last Admin: 01/14/19 08:36 Dose:  40 mg


Ferrous Sulfate (Ferrous Sulfate Tab*)  325 mg PO DAILY [facility]


   Last Admin: 01/14/19 08:34 Dose:  325 mg


Formoterol Fumarate (Perforomist Neb.Soln*(Nf))  20 mcg INH BID [facility]


   Last Admin: 01/14/19 09:42 Dose:  Not Given


Guaifenesin (Mucinex*)  600 mg PO BID [facility]


   Last Admin: 01/14/19 08:36 Dose:  600 mg


Lactulose (Lactulose*)  30 ml PO DAILY PRN


   PRN Reason: CONSTIPATION


   Last Admin: 01/04/19 12:10 Dose:  30 ml


Levalbuterol HCl (Xopenex 0.63mg/3ml Neb*)  0.63 mg INH 1700 [facility]


   Last Admin: 01/14/19 17:54 Dose:  0.63 mg


Levalbuterol HCl (Xopenex Hfa Inhaler*)  2 puff INH Q6HR PRN


   PRN Reason: SHORTNESS OF BREATH


Levetiracetam (Keppra Tab*)  250 mg PO TID [facility]


   Last Admin: 01/14/19 13:57 Dose:  250 mg


Levothyroxine Sodium (Synthroid Tab*)  50 mcg PO DAILY@0600 [facility]


   Last Admin: 01/14/19 05:35 Dose:  50 mcg


Magnesium Hydroxide (Milk Of Magnesia Liq*)  30 ml PO BID PRN


   PRN Reason: CONSTIPATION


   Last Admin: 01/11/19 15:11 Dose:  30 ml


Ondansetron HCl (Zofran Inj*)  4 mg IV Q4H PRN


   PRN Reason: NAUSEA


Oxycodone/Acetaminophen (Percocet 5/325 Tab*)  1 tab PO Q4H PRN


   PRN Reason: PAIN


   Last Admin: 01/14/19 04:48 Dose:  1 tab


Pantoprazole Sodium (Protonix Tab *)  40 mg PO DAILY [facility]


   Last Admin: 01/14/19 08:33 Dose:  40 mg


Potassium Chloride (Klor Con Er Tab*)  10 meq PO BID [facility]


   Last Admin: 01/14/19 08:32 Dose:  10 meq


Prednisone (Deltasone Tab*)  10 mg PO DAILY [facility]


   Last Admin: 01/14/19 08:34 Dose:  10 mg


Torsemide (Demadex*)  10 mg PO DAILY [facility]


   Last Admin: 01/14/19 08:32 Dose:  10 mg


Venlafaxine HCl (Effexor Xr Cap*)  225 mg PO DAILY [facility]


   Last Admin: 01/14/19 08:33 Dose:  225 mg








 Vital Signs - 8 hr











  01/14/19 01/14/19





  11:20 15:21


 


Temperature 98.2 F 98.7 F


 


Pulse Rate 83 98


 


Respiratory 16 20





Rate  


 


Blood Pressure 137/56 134/77





(mmHg)  


 


O2 Sat by Pulse 99 99





Oximetry  











Oxygen Devices in Use Now: Nasal Cannula


Appearance: alert, NAD


Eyes: No Scleral Icterus, PERRLA


Ears/Nose/Mouth/Throat: NL Teeth, Lips, Gums, Mucous Membranes Moist


Neck: NL Appearance and Movements; NL JVP, Trachea Midline


Respiratory: Symmetrical Chest Expansion and Respiratory Effort, Clear to 

Auscultation, - - diminished, no rales or wheeze


Cardiovascular: NL Sounds; No Murmurs; No JVD, RRR, No Edema


Abdominal: NL Sounds; No Tenderness; No Distention


Extremities: No Edema, No Clubbing, Cyanosis


Skin: No Rash or Ulcers, - - dressing CDI


Neurological: Alert and Oriented x 3


Result Diagrams: 


 01/14/19 04:35





 01/14/19 04:35


Additional Lab and Data: 


 Laboratory Last Values











WBC  9.3 10^3/ul (3.5-10.8)   01/07/19  05:32    


 


RBC  3.22 10^6/ul (4.00-5.40)  L  01/07/19  05:32    


 


Hgb  10.3 g/dl (12.0-16.0)  L  01/07/19  05:32    


 


Hct  31 % (35-47)  L  01/07/19  05:32    


 


MCV  95 fL (80-97)   01/07/19  05:32    


 


MCH  32 pg (27-31)  H  01/07/19  05:32    


 


MCHC  34 g/dl (31-36)   01/07/19  05:32    


 


RDW  14 % (10.5-15)   01/07/19  05:32    


 


Plt Count  177 10^3/ul (150-450)   01/07/19  05:32    


 


MPV  8.0 fL (7.4-10.4)   01/07/19  05:32    


 


Neut % (Auto)  70.0 %  01/07/19  05:32    


 


Lymph % (Auto)  11.4 %  01/07/19  05:32    


 


Mono % (Auto)  14.3 %  01/07/19  05:32    


 


Eos % (Auto)  3.9 %  01/07/19  05:32    


 


Baso % (Auto)  0.4 %  01/07/19  05:32    


 


Absolute Neuts (auto)  6.5 10^3/ul (1.5-7.7)   01/07/19  05:32    


 


Absolute Lymphs (auto)  1.1 10^3/ul (1.0-4.8)   01/07/19  05:32    


 


Absolute Monos (auto)  1.3 10^3/ul (0-0.8)  H  01/07/19  05:32    


 


Absolute Eos (auto)  0.4 10^3/ul (0-0.6)   01/07/19  05:32    


 


Absolute Basos (auto)  0 10^3/ul (0-0.2)   01/07/19  05:32    


 


Absolute Nucleated RBC  0 10^3/ul  01/07/19  05:32    


 


Nucleated RBC %  0   01/07/19  05:32    


 


INR (Anticoag Therapy)  0.98  (0.77-1.02)   01/03/19  04:37    


 


APTT  26.0 seconds (26.0-36.3)   01/02/19  14:59    


 


Sodium  138 mmol/L (135-145)   01/07/19  05:32    


 


Potassium  5.0 mmol/L (3.5-5.0)   01/07/19  05:32    


 


Chloride  102 mmol/L (101-111)   01/07/19  05:32    


 


Carbon Dioxide  34 mmol/L (22-32)  H  01/07/19  05:32    


 


Anion Gap  2 mmol/L (2-11)   01/07/19  05:32    


 


BUN  13 mg/dL (6-24)   01/07/19  05:32    


 


Creatinine  0.55 mg/dL (0.51-0.95)   01/07/19  05:32    


 


Est GFR ( Amer)  129.0  (>60)   01/07/19  05:32    


 


Est GFR (Non-Af Amer)  106.6  (>60)   01/07/19  05:32    


 


BUN/Creatinine Ratio  23.6  (8-20)  H  01/07/19  05:32    


 


Glucose  98 mg/dL ()   01/07/19  05:32    


 


Lactic Acid  1.6 mmol/L (0.5-2.0)   01/02/19  14:59    


 


Calcium  8.2 mg/dL (8.6-10.3)  L  01/07/19  05:32    


 


Total Bilirubin  0.70 mg/dL (0.2-1.0)   01/02/19  14:59    


 


AST  22 U/L (13-39)   01/02/19  14:59    


 


ALT  16 U/L (7-52)   01/02/19  14:59    


 


Alkaline Phosphatase  70 U/L ()   01/02/19  14:59    


 


Troponin I  0.02 ng/mL (<0.04)   01/02/19  14:59    


 


Total Protein  5.7 g/dL (6.4-8.9)  L  01/02/19  14:59    


 


Albumin  3.2 g/dL (3.2-5.2)   01/02/19  14:59    


 


Globulin  2.5 g/dL (2-4)   01/02/19  14:59    


 


Albumin/Globulin Ratio  1.3  (1-3)   01/02/19  14:59    


 


Urine Color  Rosy   01/02/19  20:10    


 


Urine Appearance  Cloudy   01/02/19  20:10    


 


Urine pH  6.0  (5-9)   01/02/19  20:10    


 


Ur Specific Gravity  1.016  (1.010-1.030)   01/02/19  20:10    


 


Urine Protein  Negative  (Negative)   01/02/19  20:10    


 


Urine Ketones  Negative  (Negative)   01/02/19  20:10    


 


Urine Blood  2+  (Negative)  A  01/02/19  20:10    


 


Urine Nitrate  Negative  (Negative)   01/02/19  20:10    


 


Urine Bilirubin  Negative  (Negative)   01/02/19  20:10    


 


Urine Urobilinogen  Negative  (Negative)   01/02/19  20:10    


 


Ur Leukocyte Esterase  3+  (Negative)  A  01/02/19  20:10    


 


Urine WBC (Auto)  3+(>20/hpf)  (Absent)  A  01/02/19  20:10    


 


Urine RBC (Auto)  2+(6-10/hpf)  (Absent)  A  01/02/19  20:10    


 


Urine Bacteria  Absent  (Absent)   01/02/19  20:10    


 


Urine Glucose  Negative  (Negative)   01/02/19  20:10    


 


Digoxin  0.8 ng/ml (0.8-2.0)   01/02/19  14:59    


 


Influenza A (Rapid)  Negative  (Negative)   01/03/19  15:09    


 


Influenza B (Rapid)  Negative  (Negative)   01/03/19  15:09    


 


Blood Type  A Positive   01/02/19  14:59    


 


Antibody Screen  Negative   01/02/19  14:59    











Microbiology and Other Data: 


 Microbiology











 01/02/19 20:10 Urine Culture - Final





 Urine    Escherichia Coli


 


 01/03/19 14:53 Influenza Types A,B Antigen - Final





 Nasal    Specimen received for Influenza A/B Molecular testing














Assess/Plan/Problems-Billing


Assessment: 





This is a 78 yof with a history of HTN, CHF, and COPD (on 02 at 2L), who 

presented to the ER s/p fall.  Pt was found to have a R femur fracture, and is s

/p IM Nail.





- Patient Problems


(1) Femur fracture, right


Code(s): S72.91XA - UNSP FRACTURE OF RIGHT FEMUR, INIT FOR CLOS FX   SNOMED Code

(s): 70375145


   Comment: 


 - POD9 s/p right gamma nail with Dr. Moreau


 - WBAT per ortho


 - PT/OT


 - Pain management with bowel regimen


 - Lovenox   





(2) Chronic obstructive pulmonary disease


Code(s): J44.9 - CHRONIC OBSTRUCTIVE PULMONARY DISEASE, UNSPECIFIED   SNOMED 

Code(s): 75227359


   Comment: 


 - No exacerbation noted


 - Continue mucinex, incentive spirometer


 - Continue pulmicort, perforomist, Xopenex, and prednisone, as ordered at home


 - Continue supplemental O2   





(3) Congestive heart failure


Code(s): I50.9 - HEART FAILURE, UNSPECIFIED   SNOMED Code(s): 18891653


   Comment: 


 - No exacerbation noted


 - continue demadex


 - ECHO stable on 1/4


   





(4) Coronary artery disease


Code(s): I25.10 - ATHSCL HEART DISEASE OF NATIVE CORONARY ARTERY W/O ANG PCTRS 

  SNOMED Code(s): 39424126


   Comment: 


 - s/p stent placement


 - Continue crestor


 - Per ortho ok to restart aspirin; restart plavix in 30 days (once Lovenox is d

/c'd)   





(5) Afib


Code(s): I48.91 - UNSPECIFIED ATRIAL FIBRILLATION   SNOMED Code(s): 51273268


   Comment: 


 - continue digoxin for rate control


 - has PM, no issues noted   





(6) GERD (gastroesophageal reflux disease)


Code(s): K21.9 - GASTRO-ESOPHAGEAL REFLUX DISEASE WITHOUT ESOPHAGITIS   SNOMED 

Code(s): 473839815


   Comment: 


 - Continue PPI    





(7) Hypothyroid


Code(s): E03.9 - HYPOTHYROIDISM, UNSPECIFIED   SNOMED Code(s): 73284568


   Comment: 


 - Continue levothyroxine   


Status and Disposition: 





Inpatient. Initial plan this morning was DC to home, however, auth obtained for 

STR and daughters who will care for the patient have some concerns with the 

patient going home. Patient will talk with her daughters regarding home with 

VNS vs rehab. In either case, decision will be made in AM and patient will be 

discharged. Appreciate recs from SW and CM.

## 2019-01-15 VITALS — SYSTOLIC BLOOD PRESSURE: 120 MMHG | DIASTOLIC BLOOD PRESSURE: 68 MMHG

## 2019-01-15 RX ADMIN — OXYCODONE HYDROCHLORIDE AND ACETAMINOPHEN PRN TAB: 5; 325 TABLET ORAL at 04:20

## 2019-01-15 RX ADMIN — PREDNISONE SCH MG: 10 TABLET ORAL at 09:14

## 2019-01-15 RX ADMIN — LEVETIRACETAM SCH MG: 500 TABLET, FILM COATED ORAL at 09:12

## 2019-01-15 RX ADMIN — OXYCODONE HYDROCHLORIDE AND ACETAMINOPHEN PRN TAB: 5; 325 TABLET ORAL at 09:13

## 2019-01-15 RX ADMIN — VENLAFAXINE HYDROCHLORIDE SCH MG: 75 CAPSULE, EXTENDED RELEASE ORAL at 09:13

## 2019-01-15 RX ADMIN — ATORVASTATIN CALCIUM SCH MG: 20 TABLET, FILM COATED ORAL at 09:14

## 2019-01-15 RX ADMIN — ENOXAPARIN SODIUM SCH MG: 40 INJECTION SUBCUTANEOUS at 09:16

## 2019-01-15 RX ADMIN — TORSEMIDE SCH MG: 20 TABLET ORAL at 09:13

## 2019-01-15 RX ADMIN — LEVETIRACETAM SCH MG: 500 TABLET, FILM COATED ORAL at 13:45

## 2019-01-15 RX ADMIN — POTASSIUM CHLORIDE SCH MEQ: 750 TABLET, FILM COATED, EXTENDED RELEASE ORAL at 09:15

## 2019-01-15 RX ADMIN — GUAIFENESIN SCH MG: 600 TABLET, EXTENDED RELEASE ORAL at 09:14

## 2019-01-15 RX ADMIN — BUDESONIDE SCH MG: 0.5 SUSPENSION RESPIRATORY (INHALATION) at 08:20

## 2019-01-15 RX ADMIN — PANTOPRAZOLE SODIUM SCH MG: 40 TABLET, DELAYED RELEASE ORAL at 09:14

## 2019-01-15 RX ADMIN — FORMOTEROL FUMARATE DIHYDRATE SCH MCG: 20 SOLUTION RESPIRATORY (INHALATION) at 08:20

## 2019-01-15 RX ADMIN — LEVOTHYROXINE SODIUM SCH MCG: 50 TABLET ORAL at 06:02

## 2019-01-15 RX ADMIN — ASPIRIN SCH MG: 81 TABLET, CHEWABLE ORAL at 09:15

## 2019-01-15 RX ADMIN — FERROUS SULFATE TAB 325 MG (65 MG ELEMENTAL FE) SCH MG: 325 (65 FE) TAB at 09:14

## 2019-01-15 RX ADMIN — DIGOXIN SCH MG: 125 TABLET ORAL at 09:15

## 2019-01-15 RX ADMIN — DOCUSATE SODIUM SCH MG: 100 CAPSULE, LIQUID FILLED ORAL at 09:14

## 2019-01-15 RX ADMIN — OXYCODONE HYDROCHLORIDE AND ACETAMINOPHEN PRN TAB: 5; 325 TABLET ORAL at 13:45

## 2019-01-15 NOTE — DS
*** AMENDED REPORT NOW INCLUDES DESIGNATED COSIGNER ***



CC:  Dr. Romo, [place], [place]; Dr. Clemencia Moreau; Dr. Kianna Bernal, [facility] *

 

DATE OF ADMISSION:  01/02/2019.

 

DATE OF DISCHARGE:  01/15/2019.

 

PRIMARY CARE PHYSICIAN:  Dr. Romo, [place], [place].

 

ATTENDING PHYSICIAN FOR TODAY:  Dr. Gloria * (dictated by Mike Martinez NP).

 

HOSPITAL COURSE:  Please refer to admitting history and physical by Angle Cerda NP dated the 2nd.  However, in short, this is a 78-year-old female 
with a complex past medical history significant for severe COPD with oxygen 
dependence, history of heart failure, atrial fibrillation, esophageal stricture
, chronic UTI's, hypothyroidism, and hyperlipidemia who presented to the 
emergency department on the second with reported slip and fall.  The fall 
actually happened on December 31st; however, the patient normal ambulates with 
a walker.  She had been using her walker and due to increased pain and 
difficulty bearing weight on that side, the patient's family brought her to the 
emergency department for evaluation where she was found to have a fracture of 
the right hip.  The patient was seen in consultation by Dr. Moreau and her team 
for surgical evaluation.  The patient is on Plavix and aspirin. These needed to 
be held for several days prior to her surgery; however, she was on Plavix and 
aspirin for a history of TIA.  The team did not feel that she was too high risk 
to be off these medications and the benefit of surgical intervention far 
outweighed her need for antiplatelet aggregation during this admission.  The 
patient was optimized for surgery in the form of echocardiogram, chest x-ray, 
and risk stratification.  She underwent her procedure on 01/05/2019.  She had 
an IM nail placed.  She did not have any intraoperative complication.  She did 
have some postprocedure anemia that was managed.

 

The patient did have some bouts with hypoxia and she was complaining of left 
calf pain.  On the 9th of January, she underwent CTA of the chest and thorax to 
rule out PE and also venous Doppler bilaterally to rule out DVT's.  These were 
all negative. It was thought that her increased hypoxia was more secondary to 
her chronic obstructive pulmonary disease.  Her oxygen was titrated and she was 
managed conservatively.  Also of significant note, on that imaging, she was 
found to have a right breast mass.  According to the CAT scan report, there was 
an ovoid soft tissue focus within the lower outer right breast quadrant 
measuring 3.7 x 2.3 cm and then also some calcifications.  She underwent 
evaluation with a diagnostic mammogram on the 10th of January which confirmed a 
large, solid, circumscribed, relatively low suspicion mass present in the upper 
outer quadrant of the right breast, recommending ultrasound-guided core biopsy 
for further evaluation. Ultrasound also confirmed the same.  At that point, 
discussion was had with the patient and her family that she should have 
outpatient biopsy as soon as she is healed from her hip fracture.

 

Initially, the patient did not want to go to short-term rehab.  There was some 
discussion with her and her family regarding the past discharge plan and 
ultimately this morning it was determined that the patient would benefit form 
short-term rehab and then go home with VNS services on an as needed basis.

 

REVIEW OF SYSTEMS THE DAY OF DISCHARGE:  The patient is denying any fever, 
fatigue, or chills; no chest pain, no shortness of breath; no vomiting, some 
intermittent nausea which is actually relieved with food.  Hip pain on the 
operative side, a little more pronounced today after having physical therapy 
this morning. Otherwise, she has no further constitutional complaints.

 

PHYSICAL EXAMINATION TODAY:  General: Frail, elderly woman around her stated 
age. Vital Signs:  Blood pressure 137/67, heart rate 70, respiratory rate 17, 
O2 saturation 100 percent on 2 liters with a temperature 98.3.  HEENT: The 
patient is atraumatic, normocephalic.  PERRLA with nonicteric sclerae.  Oral 
mucosa is moist. Tongue is midline.  Neck: Supple, nontender.  No JVD noted.  
No carotid bruits auscultated.  Cardiovascular:  S1, S2 present.  Rate is 
controlled, rhythm is slightly irregular.  No gallops or rubs noted.  Lungs: 
Clear at the apices bilaterally, diminished at the bases.  No wheezing, rhonchi
, or rales noted. Abdomen: Soft, nontender, nondistended. Positive bowel sounds 
in all four quadrants.  : Deferred.  Musculoskeletal:  There is no clubbing, 
no cyanosis, no edema.  She has some point tenderness to palpation over her 
surgical incision sites.  No erythema, edema, or overt drainage noted.  Skin:  
Warm, dry, otherwise intact.  Neurologic:  She does have some essential tremor 
at her baseline.  She is otherwise neurologically intact with no gross focal 
deficits.  Psychiatric:  She is cooperative and appropriate.

 

LABORATORY DATA:  WBC 7.6, RBC 3.37, hemoglobin 10.6, hematocrit 32, platelets 
339; sodium 140, potassium 4.0, chloride 100, CO2 35, BUN 16, creatinine 0.71, 
GFR 79.4, calcium 9.2, glucose 83.  Liver function within normal limits.  
Troponins were negative, last troponin was 0.02.  Total protein 5.7, albumin 3.2
, globulin 2.5. Urinalysis shows yellow cloudy urine, negative for any acute 
infectious process. INR on January 3rd was 0.98.  Digoxin level was 0.8.  
Influenza screening dated 01/03/2019 were negative for influenza A and 
influenza B.

 

IMAGING STUDIES:  CAT scan and diagnostic mammogram as noted above.  Initial 
imaging of the hip dated 01/02/2019 shows asymmetric discontinuity along the 
superior cortex of the right greater trochanter with a questionable lucent line 
extending inferiorly into the medullary cavity, though the lesser trochanter 
appears to be intact.  If there is strong clinical suspicion for an occult 
fracture of the right hip CT imaging is advised.

 

DISCHARGE DIAGNOSES:

1.  Status post slip and fall with a right femur fracture, status post IM nail, 
postop day ten.

2.  History of chronic obstructive pulmonary disease with oxygen dependence, 
not in exacerbation.

3.  History of heart failure with no exacerbation noted and a stable 
echocardiogram preoperatively.

4.  History of coronary artery disease with a history of PCI, currently stable.

5.  History of atrial fibrillation on Digoxin for rate control.  Also has 
pacemaker insertion.

6.  History of GERD, on PPI.

7.  Hypothyroid, well-controlled on Levothyroxine.

 

MEDICATIONS FOR DISCHARGE:

1.  Xopenex inhaler two puffs inhaled q.6 hours as needed.

2.  Keppra 250 mg p.o. 3 times daily.

3.  Aricept 10 mg p.o. at bedtime.

4.  Aspirin 81 mg daily.

5.  Primidone 150 mg p.o. at bedtime.

6.  Diflucan 100 mg p.o. on Wednesdays.

7.  Klor Con 10 mEq p.o. b.i.d.

8.  Ferrous Sulfate 325 mg daily.

9.  Crestor 10 mg p.o. daily.

10. Docusate Sodium 200 mg p.o. daily.

11. Digoxin 125 mcg daily.

12. Vitamin B complex one cap p.o. daily.

13. Vitamin D3 1,000 units p.o. daily.

14. Torsemide 10 mg p.o. daily.

15. Zofran 4 mg p.o. q.6 hours as needed.

16. Prednisone 10 mg p.o. daily.

17. Effexor 225 mg p.o. daily.

18. Levothyroxine 50 mcg daily.

19. Pantoprazole 40 mg p.o. daily.

20. Xopenex inhaler 0.63 mg inhaled at 5:00 daily.

21. Perforomist nebulizer solution 20 mcg inhaled b.i.d.

22. Budesonide neb 0.5 mg inhaled b.i.d.

23. Percocet one tab q.4 hours as needed.

24. Guaifenesin 600 mg p.o. b.i.d.

25. Milk of Magnesia 30 ml p.o. b.i.d. as needed.

26. Lactulose 30 ml p.o. daily as needed.

27. Lovenox 40 mg subcu daily to complete on February 5th 30 days postop.

28. Tylenol 650 mg p.o. q.4 hours.

 

Change in home medications:  May start Plavix day after stopping Lovenox on 
February 6th.

 

DISPOSITION:  The patient will be discharged to [facility] for short-term 
rehab.

 

CONDITION ON DISCHARGE:  Stable.

 

DIET:  Heart-healthy as tolerated.

 

ACTIVITY:  Weight-bearing as tolerated.  Use of walker encouraged.  No other 
restrictions.

 

WOUND CARE:  Sterile dry dressing changes daily.

 

FOLLOW-UP:  The patient is instructed to follow-up with Dr. Clemencia Moreau this 
Friday for wound check and staple removal.  Should also follow-up with [facility] once discharged and also see her primary care provider
, Dr. Romo, when she is back in her primary home which is approximately three 
to four hours away from this area. Patient also needs core biopsy, this may be 
coordinated with Dr. Swanson, Pathologist at [facility].

 

DISPOSITION:  The patient was discharged in stable condition via ambulance 
transport on oxygen by stretcher.  All questions were answered.  I had 
extensive discussions with both patient's daughters regarding her discharge 
plan of care and they are both in agreement with the discharge planning.

 

TIME SPENT:  Approximately 45 minutes coordinating follow-ups, discharge 
planning, and medications. This discharge summary will also suffice as her 
admission history and physical to [facility].

 

____________________________________ MIKE MARTINEZ, ZORAIDA

 

[id number]/CPS #: [id number]

ELIZA

## 2019-01-25 ENCOUNTER — HOSPITAL ENCOUNTER (EMERGENCY)
Dept: HOSPITAL 25 - ED | Age: 79
Discharge: HOME | End: 2019-01-25
Payer: MEDICARE

## 2019-01-25 VITALS — DIASTOLIC BLOOD PRESSURE: 58 MMHG | SYSTOLIC BLOOD PRESSURE: 105 MMHG
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
## 2019-01-25 DIAGNOSIS — S89.91XA: Primary | ICD-10-CM

## 2019-01-25 DIAGNOSIS — I10: ICD-10-CM

## 2019-01-25 DIAGNOSIS — K21.9: ICD-10-CM

## 2019-01-25 DIAGNOSIS — Z87.891: ICD-10-CM

## 2019-01-25 DIAGNOSIS — I50.9: ICD-10-CM

## 2019-01-25 DIAGNOSIS — S72.001D: ICD-10-CM

## 2019-01-25 DIAGNOSIS — S60.229A: ICD-10-CM

## 2019-01-25 DIAGNOSIS — Z79.01: ICD-10-CM

## 2019-01-25 PROCEDURE — 99283 EMERGENCY DEPT VISIT LOW MDM: CPT

## 2019-01-25 NOTE — ED
Lower Extremity





- HPI Summary


HPI Summary: 


Pt. is a 78 y.o female who presents to the ER via EMS after a fall. Pt. 

currently resides at nursing home after sustaining a right hip fracture 3 weeks 

ago. Pt. states since hip fx she uses a walker but prior to fx she was 

ambulatory at home by herself. Pt. states this morning she went to get out of 

bed when she slipped injuring her right hand and right right hip. Pt. states 

she slid down side of bed and did not fall all the way to the floor and denies 

striking head. She is on Plavix. Pt. denies prior or current CP, SOB, abd. pain

, recent illness, V/D, headache. Sxs are moderate in severity. Movement makes 

sxs worse. Rest makes sxs better.








- History of Current Complaint


Chief Complaint: EDExtremityLower


Stated Complaint: FALL


Time Seen by Provider: 01/25/19 05:48


Hx Obtained From: Patient


Pain Intensity: 9





- Allergies/Home Medications


Allergies/Adverse Reactions: 


 Allergies











Allergy/AdvReac Type Severity Reaction Status Date / Time


 


No Known Allergies Allergy   Verified 01/02/19 20:35











Home Medications: 


 Home Medications





Clopidogrel Bisulfate [Plavix] 75 mg PO DAILY 01/25/19 [History Confirmed 01/25/ 19]











PMH/Surg Hx/FS Hx/Imm Hx


Previously Healthy: Yes


Endocrine/Hematology History: Reports: Hx Anticoagulant Therapy, Hx Thyroid 

Disease


   Denies: Hx Diabetes


Cardiovascular History: Reports: Hx Congestive Heart Failure, Hx Hypertension, 

Hx Pacemaker/ICD, Other Cardiovascular Problems/Disorders - AFIB


Respiratory History: Reports: Hx Asthma, Hx Chronic Obstructive Pulmonary 

Disease (COPD)


GI History: Reports: Hx Gastroesophageal Reflux Disease


Musculoskeletal History: Reports: Hx Osteoporosis


Sensory History: Reports: Hx Contacts or Glasses


   Denies: Hx Hearing Aid


Opthamlomology History: Reports: Hx Contacts or Glasses


Neurological History: Reports: Hx Migraine - AS A CHILD


Psychiatric History: Reports: Hx Anxiety, Hx Depression





- Cancer History


Hx Chemotherapy: No


Hx Radiation Therapy: No





- Surgical History


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  

cholecystectomy.  appendectomy


Infectious Disease History: No


Infectious Disease History: 


   Denies: Traveled Outside the [place] in Last 30 Days





- Family History


Known Family History: Positive: Hypertension, Non-Contributory





- Social History


Occupation: Retired


Lives: At The Nursing Home


Alcohol Use: None


Substance Use Type: Reports: None


Smoking Status (MU): Former Smoker


Type: Cigarettes


Have You Smoked in the Last Year: No





Review of Systems


Constitutional: Negative


Negative: Fever


Cardiovascular: Negative


Negative: Palpitations, Chest Pain


Respiratory: Negative


Negative: Cough


Gastrointestinal: Negative


Negative: Abdominal Pain, Vomiting, Diarrhea


Positive: Other - Right hand, hip and knee pain


Positive: Bruising - right hand


Negative: Headache, Weakness, Paresthesia, Numbness, Syncope, Slurred Speech


All Other Systems Reviewed And Are Negative: Yes





Physical Exam


Triage Information Reviewed: Yes


Vital Signs On Initial Exam: 


 Initial Vitals











Temp Pulse Resp BP Pulse Ox


 


 97.8 F   81   18   151/80   97 


 


 01/25/19 05:43  01/25/19 05:43  01/25/19 05:43  01/25/19 05:43  01/25/19 05:43











Vital Signs Reviewed: Yes


Appearance: Positive: Well-Appearing - Pt. sitting up in bed in NAD. Pleasant. 

Answers questions appropriately.


Skin: Positive: Warm, Dry


Head/Face: Positive: Normal Head/Face Inspection


Eyes: Positive: Normal, EOMI, ELISE, Conjunctiva Clear


Neck: Positive: Supple


Respiratory/Lung Sounds: Positive: Clear to Auscultation, Breath Sounds Present


Cardiovascular: Positive: Normal, RRR


Abdomen Description: Positive: Nontender, Soft


Musculoskeletal: Positive: Other - Ecchymosis and pain noted to dorsum of right 

hand. Good radial pulse. No breaks in the skin. No proximal pain. Healing 

surgical incision with staples notes to the right lateral hip and femur without 

signs of infection. Pain on palpation over lateral hip and mild pain to knee. 5/

5 strength in LEs.


Neurological: Positive: Normal, Alert, Oriented to Person Place, Time, CN 

Intact II-III


Psychiatric: Positive: Affect/Mood Appropriate





- [place] Coma Scale


Best Eye Response: 4 - Spontaneous


Best Motor Response: 6 - Obeys Commands


Best Verbal Response: 5 - Oriented


Coma Scale Total: 15





Diagnostics





- Vital Signs


 Vital Signs











  Temp Pulse Resp BP Pulse Ox


 


 01/25/19 05:43  97.8 F  81  18  151/80  97














- Laboratory


Lab Statement: Any lab studies that have been ordered have been reviewed, and 

results considered in the medical decision making process.





Lower Extremity Course/Dx





- Course


Course Of Treatment: Pt. presenting for right hip, knee and hand pain after 

mechanical fall. VS stable. Pt. denies pain medication. Pending xrays. well.  

Is x-ray shows recent surgical changes without acute findings.  Pending x-rays 

negative for acute findings.  Knee x-ray is concerning for avulsion fracture to 

the proximal lateral tibia.  Patient does have pain to palpation to this area.  

X-rays were read by radiology.  I spoke with on-call orthopedics, Dr. Orlando, 

who recommends knee CT for further evaluation.  Knee CT is negative for 

fracture or acute findings, reading per radiology.  Patient able to ambulate to 

bathroom with her walker without difficulty.  Patient states majority of her 

pain is to distal femur and knee.  We'll discharge home back to nursing home.  

To follow up with orthopedics if pain continues.  Ice and elevate.  Tylenol for 

pain as directed.  Patient understands and agrees with plan.





- Diagnoses


Differential Diagnosis/HQI/PQRI: Positive: Arthritis, Fracture (Closed), Sprain

, Strain


Provider Diagnoses: 


 Knee injury, Hand contusion








Discharge





- Sign-Out/Discharge


Documenting (check all that apply): Patient Departure





- Discharge Plan


Condition: Good


Disposition: HOME


Patient Education Materials:  Knee Sprain (ED)


Referrals: 


Clemencia Moreau MD [Medical Doctor] - 


Additional Instructions: 


Schedule a follow up appointment with Dr. Moreau


Ice and elevate knee


Gentle range of motion


Tylenol for pain as directed


Return to ER if symptoms change or worsen





- Billing Disposition and Condition


Condition: GOOD


Disposition: Home

## 2019-02-05 ENCOUNTER — HOSPITAL ENCOUNTER (EMERGENCY)
Dept: HOSPITAL 25 - ED | Age: 79
Discharge: HOME | End: 2019-02-05
Payer: MEDICARE

## 2019-02-05 VITALS — SYSTOLIC BLOOD PRESSURE: 133 MMHG | DIASTOLIC BLOOD PRESSURE: 60 MMHG

## 2019-02-05 DIAGNOSIS — Z98.890: ICD-10-CM

## 2019-02-05 DIAGNOSIS — Z91.81: ICD-10-CM

## 2019-02-05 DIAGNOSIS — M25.551: Primary | ICD-10-CM

## 2019-02-05 DIAGNOSIS — Z87.891: ICD-10-CM

## 2019-02-05 PROCEDURE — 99281 EMR DPT VST MAYX REQ PHY/QHP: CPT

## 2019-02-05 NOTE — XMS REPORT
Continuity of Care Document (CCD)

 Created on:2019



Patient:Aimee Cutler

Sex:Female

:1940

External Reference #:[id number]





Demographics







 Address  [address] [place] [address]

 

 Mobile Phone  [phone]

 

 Email Address  [email or web address]

 

 Preferred Language  en

 

 Marital Status  Not  or 

 

 Ep[employer] Affiliation  Unknown

 

 Race  White

 

 Ethnic Group  Not  or 









Author







 Name  Lilia Lorenzo









Support







 Name  Relationship  Address  Phone

 

 Reena King  Unavailable  Unavailable  [phone]

 

 ManuelaChristin lane  Unavailable  Unavailable  [phone]









Care Team Providers







 Name  Role  Phone

 

 Patient's Choice  Primary Care Physician  Unavailable









Payers







 Type  Date  Identification Numbers  Payment Provider  Subscriber

 

     Policy Number: [id number]  [facility] [employer]  Aimee Cutler









 PayID: [id number]  [address] [address]









 [place] [address]







Advance Directives







 Description

 

 No Information Available







Problems







 Description

 

 No Information







Family History







 Date  Family Member(s)  Problem(s)  Comments

 

   General  Diabetes  

 

   General  Heart Disease  

 

   General  Rheumatoid Arthritis  

 

   General  Chronic Obstructive Pulmonary Disease (COPD)  







Social History







 Type  Date  Description  Comments

 

 Birth Sex    Unknown  

 

 Lives With    Daughter  

 

 ETOH Use    Denies alcohol use  

 

 Tobacco Use  Start: Unknown End:  Patient is a former smoker  



   Unknown    

 

 Smoking Status  Reviewed: 19  Patient is a former smoker  

 

 Exercise Type/Frequency    Does not exercise  







Allergies, Adverse Reactions, Alerts







 Description

 

 No Known Drug Allergies







Medications







 Medication  Date  Status  Form  Strength  Qnty  SIG  Indications  Ordering



                 Provider

 

 Perforomist  /  Active  Nebulizer  20mcg/2ML    1 unit via    Unknown



   0000          nebulizer    



             twice a day    

 

 Budesonide  /  Active  Suspension  0.5mg/2ML    inhale the    Unknown



   0000          contents of    



             1 vial via    



             nebulizer    



             two times    



             daily    

 

 Xopenex  /  Active  Nebulizer  0.63mg/3ML    use every    Unknown



   0000          4-6 hours    



             as needed.    



             last office    



             visit    



             19    

 

 Pantoprazole  /  Active  Tablets DR  40mg    1 by mouth    Unknown



 Sodium  0000          every day    

 

 Fexofenadine  /  Active  Tablets  180mg    1 by mouth    Unknown



 HCL ER  0000          every day    

 

 Synthroid  /  Active  Tablets  50mcg    1 by mouth    Unknown



   0000          every day    

 

 Prednisone  00//  Active  Tablets  10mg        Unknown



   0000              

 

 Zofran  /  Active  Tablets  4mg    1 every 12    Unknown



   0000          hours as    



             needed    

 

 Furosemide  /  Active    10mg    1 by mouth    Unknown



   0000          every day    

 

 Vitamin D3  /  Active            Unknown



   0000              

 

 Vitamin B  /  Active            Unknown



 Complex  0000              

 

 Digitek  /  Active  Tablets  125mcg    every at    Unknown



   0000          bedtime    

 

 Plavix  /  Active  Tablets  75mg    1 by mouth    Unknown



   0000          every day    

 

 Crestor  /  Active  Tablets  10mg    1 by mouth    Unknown



   0000          every day    

 

 Stool Softener  /  Active  Capsules  100mg    1 by mouth    Unknown



   0000          every day    

 

 Iron  /  Active  Tablets  325(65Fe)    1 by mouth    Unknown



   0000      mg    every day    

 

 Potassium  /  Active  Solution  10Meq/50ML    bid    Unknown



 Chloride  0000              

 

 Fluconazole  /  Active  Tablets  100mg    3 tablets    Unknown



   0000          by mouth    



             and repeat    



             in 1 week    

 

 Primidone  /  Active  Tablets  50mg    take 1    Unknown



   0000          tablet by    



             mouth twice    



             a day    

 

 Aspirin 81 Low  /  Active  Chewtabs  81mg    1 by mouth    Unknown



 Dose  0000          every day    

 

 Aricept  /  Active  Tablets  10mg    1 by mouth    Unknown



   0000          every day    

 

 Keppra  /  Active  Tablets  250mg    1 by mouth    Unknown



   0000          twice a day    







Immunizations







 Description

 

 No Information Available







Vital Signs







 Date  Vital  Result  Comment

 

 2019 11:15am  Height  63.5 inches  5'3.50"









 Weight  120.00 lb  

 

 BP Systolic  130 mmHg  

 

 BP Diastolic  70 mmHg  

 

 Body Temperature  97.7 F  

 

 BMI (Body Mass Index)  20.9 kg/m2  







Results







 Description

 

 No Information Available







Procedures







 Date  Code  Description  Status

 

 2019  53111  FX TX Inter/Leona Or Sub Chanteric Femoral FX W/Implant  
Completed

 

 2019  82439  FX TX Inter/Leona Or Sub Chanteric Femoral FX W/Implant  
Completed

 

 2019  53517  ECHO Transthorasic Realtime 2D W Doppler & Color Flow Hosp  
Completed







Encounters







 Type  Date  Location  Provider  Dx  Diagnosis

 

 Office Visit  2019  [facility]  Norma Zhang,  M25.562  Pain in 
left knee



   9:00a    MD    









 R29.6  Repeated falls









 Office Visit  2019  8:45a  [facility]  Julieth Kraft  R14.0  
Abdominal



       Cunha, NP    distension



           (gaseous)









 Z66  Do not resuscitate









 Office Visit  2019  [facility]  [facility]lem[facility]  S72.141A  Displaced



   8:12a  [facility]  KAM[facility]N Moreau    intertrochanteric



     [facility]      fracture of right



           femur, init









 S72.111A  Disp fx of greater trochanter of right femur, init









 Office Visit  2019  [facility]  Joanne  S72.141A  Displaced



   8:11a  [facility]  ESTER Hale    intertrochanteric



     [facility]      fracture of right



           femur, init

 

 Office Visit  2019  [facility]  Magalys  S72.114A  Nondisp fx of greater



   8:10a  [facility]  MD Darion    trochanter of right



     [facility]      femur, init







Plan of Treatment

Future Appointment(s):2019  1:30 pm - Clemencia Moreau M.D. at [facility]2019 - Clemencia Moreau M.D.M25.551 Pain in right 
hipFollow up:Follow up:   4 weeks

## 2019-02-05 NOTE — UC
Hip/Pelvis Pain





- HPI Summary


HPI Summary: 


Patient is a 78-year-old female with hx of right hip surgery 1/5/19 presenting 

to ed after landing on the right hip in a fall that occured this morning. 

Patient had fallen off of the couch - family notes that cushions were stacked 

in order to make it easier for patient to stand which may have precipitated the 

fall. Patient reports moderate pain to the lateral aspect of the right hip near 

previous incision. Describes the pain as achy and sharp, and increases 

intensity to 8/10 with movement. Patient is able to ambulate with a walker. 

Manages pain with percocet as prescribed by Dr. Moreau at [facility]. 

Has follow-up with Dr. Moreau beginning of March. Patient had been living at 

[facility] and sustained a fall on 1/25/2019. She was taken to the ed and 

imaging revealed no fracture at the time. Patient is currently living with 

family, and request a hospital bed for the patient until she is able to move 

back into her home. 





- History Of Current Complaint


Chief Complaint: EDExtremityLower


Stated Complaint: FALL RIGHT HIP PAIN


Time Seen by Provider: 02/05/19 12:47


Hx Obtained From: Patient, Family/Caretaker


Pregnant?: No - postmenopausal 


Onset/Duration: Lasting Hours


Timing: Constant


Severity Initially: Moderate


Severity Currently: Moderate


Pain Intensity: 5


Pain Scale Used: 0-10 Numeric


Location: Diffuse


Character Of Pain: Sharp, Aching - right lateral hip


Aggravating Factor(s): Movement, Weight Bearing


Alleviating Factor(s): Rest





- Allergies/Home Medications


Allergies/Adverse Reactions: 


 Allergies











Allergy/AdvReac Type Severity Reaction Status Date / Time


 


No Known Allergies Allergy   Verified 02/05/19 11:43














PMH/Surg Hx/FS Hx/Imm Hx


Previously Healthy: Yes


Other History Of: Anticoagulant Therapy





- Surgical History


Surgical History: Yes


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  

cholecystectomy.  appendectomy





- Family History


Known Family History: Positive: Hypertension, Non-Contributory





- Social History


Alcohol Use: None


Substance Use Type: None


Smoking Status (MU): Former Smoker


Type: Cigarettes


Have You Smoked in the Last Year: No





- Immunization History


Most Recent Influenza Vaccination: OCTOBER 2018


Most Recent Pneumonia Vaccination: 2016





Review of Systems


All Other Systems Reviewed And Are Negative: Yes


Constitutional: Positive: Negative.  Negative: Fever, Chills, Fatigue


Skin: Positive: Negative


Eyes: Positive: Negative


ENT: Positive: Negative


Respiratory: Negative: Shortness Of Breath - no more than usual, Cough


Cardiovascular: Negative: Palpitations, Chest Pain


Gastrointestinal: Negative: Abdominal Pain, Vomiting, Diarrhea, Nausea


Genitourinary: Positive: Negative.  Negative: Dysuria, Frequency, Urgency


Musculoskeletal: Positive: Arthralgia, Decreased ROM, Edema - Lower extremity.  

Negative: Myalgia


Neurological: Negative: Headache, Weakness





Physical Exam


Triage Information Reviewed: Yes


Appearance: Well-Appearing, No Pain Distress, Well-Nourished, Thin


Vital Signs: 


 Initial Vital Signs











Temp  98.2 F   02/05/19 11:39


 


Pulse  89   02/05/19 11:39


 


Resp  16   02/05/19 11:39


 


BP  134/85   02/05/19 11:39


 


Pulse Ox  97   02/05/19 11:39











Vital Signs Reviewed: Yes


Eye Exam: Normal


ENT: Positive: Hearing grossly normal.  Negative: Nasal drainage


Neck: Positive: Supple, Nontender, No Lymphadenopathy


Respiratory: Positive: Lungs clear, Normal breath sounds, No respiratory 

distress - on oxygen


Cardiovascular: Positive: RRR, Pulses Normal, Brisk Capillary Refill


Abdomen Description: Positive: Nontender, No Organomegaly, Soft


Bowel Sounds: Positive: Present


Musculoskeletal: Positive: Strength Limited @ - right hip flexion, Edema @ - 

bilateral lower extremities, Other: - non tender with palpation of lateral hip


Neurological: Positive: Alert, Muscle Tone Normal


Psychological: Positive: Normal Response To Family


Skin Exam: Normal - skin is intact. well-healed incision to lateral right hip, 

no ecchymosis or signs of infection at incision site.





Hip Injury Course/Dx





- Course


Course Of Treatment: Nurse's note reviewed. Patient presents to the ED with 5/

10 right hip pain after sustaining a fall off a couch this morning. Patient had 

right hip surgery 1/5/2019 performed by Dr. Moreau with [facility]. 

Patient denies head injury. Pain is intensified to 8/10 with movement, but is 

able to ambulate. Patient denies color changes or deformity. Presented to the 

ED d/t fall on 1/25 with no acute findings on imaging. Mild tenderness to 

palpation to the lateral aspect of the right hip. Strength and ROM intact. No 

acute findings on X-ray. Discussed these findings with the patient and family. 

Continue pain management and medication as prescribed by Dr. Moreau. Social work 

was contacted to discuss possibility of obtaining a temporary hospital bed for 

patient, and patient does not qualify by [facility] standards. Advised to 

call Dr. Moreau today or tomorrow to notify her of recent hx of falls and the 

possibility of evaluation for a hospital bed prescription.





- Differential Dx/Diagnosis


Differential Diagnosis/HQI/PQRI: Arthritis, Bursitis, Contusion, Hematoma


Provider Diagnosis: 


 Hip pain, Fall








Discharge





- Sign-Out/Discharge


Documenting (check all that apply): Patient Departure


Patient Received Moderate/Deep Sedation with Procedure: No





- Discharge Plan


Condition: Stable


Disposition: HOME


Referrals: 


No Primary Care Phys,NOPCP [Primary Care Provider] - 


Additional Instructions: 


Please call Dr. Moreau 


Get up from bed very slowly and with help when possible





- Billing Disposition and Condition


Condition: STABLE


Disposition: Home